# Patient Record
Sex: MALE | Race: BLACK OR AFRICAN AMERICAN | NOT HISPANIC OR LATINO | Employment: FULL TIME | URBAN - METROPOLITAN AREA
[De-identification: names, ages, dates, MRNs, and addresses within clinical notes are randomized per-mention and may not be internally consistent; named-entity substitution may affect disease eponyms.]

---

## 2017-01-20 ENCOUNTER — OFFICE VISIT (OUTPATIENT)
Dept: SURGERY | Facility: CLINIC | Age: 52
End: 2017-01-20

## 2017-01-20 DIAGNOSIS — Z09 SURGICAL FOLLOW-UP CARE: Primary | ICD-10-CM

## 2017-01-20 PROCEDURE — 99024 POSTOP FOLLOW-UP VISIT: CPT | Performed by: SURGERY

## 2017-01-20 NOTE — MR AVS SNAPSHOT
Urban Molina   2017 1:30 PM   Office Visit    Dept Phone:  [phone]   Encounter #:  [id number]    Provider:  Matti Kelley MD   Department:  [facility] GENERAL SURGERY                Your Full Care Plan              Your Updated Medication List      Notice  As of 2017  1:17 PM    You have not been prescribed any medications.            You Were Diagnosed With        Codes Comments    Surgical follow-up care    -  Primary ICD-10-CM: Z09  ICD-9-CM: V67.00       Instructions     None    Patient Instructions History      Upcoming Appointments     Visit Type Date Time Department    INMATE 2017  1:30 PM [facility]      [employer] Signup     [facility] Event[employer] allows you to send messages to your doctor, view your test results, renew your prescriptions, schedule appointments, and more. To sign up, go to [facility] [employer] and click on the Sign Up Now link in the New User? box. Enter your [employer] Activation Code exactly as it appears below along with the last four digits of your Social Security Number and your Date of Birth () to complete the sign-up process. If you do not sign up before the expiration date, you must request a new code.    Sonalight Activation Code: [email or web address]  Expires: 2/3/2017  1:17 PM    If you have questions, you can email [email or web address] or call [phone] to talk to our Sonalight staff. Remember, [employer]ight is NOT to be used for urgent needs. For medical emergencies, dial 911.               Other Info from Your Visit           Your Appointments     2017  1:30 PM EST   INMATE with Matti Kelley MD   [facility] GENERAL SURGERY (--)    [address]  [place] [address] [phone]              Allergies     No Known Allergies      Reason for Visit     Post-op Follow-up           Vital Signs     Smoking Status                   Smoker, Current Status  Unknown           Problems and Diagnoses Noted     Surgical follow-up care    -  Primary

## 2017-01-20 NOTE — PROGRESS NOTES
9 wks 4 days s/p [facility]  He is status post right inguinal hernia repair.  He is without complaints.  His testicle is normal incision is well-healed.  There is no impulse.  He may resume normal activities and I will see him back when necessary.

## 2019-10-18 ENCOUNTER — APPOINTMENT (EMERGENCY)
Dept: RADIOLOGY | Facility: HOSPITAL | Age: 54
End: 2019-10-18
Payer: COMMERCIAL

## 2019-10-18 ENCOUNTER — HOSPITAL ENCOUNTER (EMERGENCY)
Facility: HOSPITAL | Age: 54
Discharge: HOME | End: 2019-10-18
Attending: EMERGENCY MEDICINE
Payer: COMMERCIAL

## 2019-10-18 ENCOUNTER — OFFICE VISIT (OUTPATIENT)
Dept: RETAIL CLINIC | Facility: CLINIC | Age: 54
End: 2019-10-18

## 2019-10-18 VITALS
OXYGEN SATURATION: 93 % | RESPIRATION RATE: 16 BRPM | HEART RATE: 100 BPM | SYSTOLIC BLOOD PRESSURE: 136 MMHG | TEMPERATURE: 99.7 F | DIASTOLIC BLOOD PRESSURE: 84 MMHG

## 2019-10-18 VITALS
RESPIRATION RATE: 16 BRPM | DIASTOLIC BLOOD PRESSURE: 77 MMHG | SYSTOLIC BLOOD PRESSURE: 127 MMHG | HEART RATE: 64 BPM | HEIGHT: 72 IN | OXYGEN SATURATION: 99 % | WEIGHT: 166.4 LBS | BODY MASS INDEX: 22.54 KG/M2

## 2019-10-18 DIAGNOSIS — R74.01 TRANSAMINITIS: ICD-10-CM

## 2019-10-18 DIAGNOSIS — B27.90 EBV INFECTION: ICD-10-CM

## 2019-10-18 DIAGNOSIS — R17 ELEVATED BILIRUBIN: ICD-10-CM

## 2019-10-18 DIAGNOSIS — Z00.00 ENCOUNTER FOR HEALTH MAINTENANCE EXAMINATION IN ADULT: ICD-10-CM

## 2019-10-18 DIAGNOSIS — I26.93 SINGLE SUBSEGMENTAL PULMONARY EMBOLISM WITHOUT ACUTE COR PULMONALE (HCC): Primary | ICD-10-CM

## 2019-10-18 DIAGNOSIS — R73.9 HYPERGLYCEMIA: Primary | ICD-10-CM

## 2019-10-18 DIAGNOSIS — R17 JAUNDICE: ICD-10-CM

## 2019-10-18 LAB
ALBUMIN SERPL BCP-MCNC: 3 G/DL (ref 3.5–5)
ALP SERPL-CCNC: 399 U/L (ref 46–116)
ALT SERPL W P-5'-P-CCNC: 469 U/L (ref 12–78)
ANION GAP SERPL CALCULATED.3IONS-SCNC: 8 MMOL/L (ref 4–13)
APTT PPP: 26 SECONDS (ref 23–37)
AST SERPL W P-5'-P-CCNC: 318 U/L (ref 5–45)
BASOPHILS # BLD MANUAL: 0 THOUSAND/UL (ref 0–0.1)
BASOPHILS NFR MAR MANUAL: 0 % (ref 0–1)
BILIRUB SERPL-MCNC: 7.4 MG/DL (ref 0.2–1)
BUN SERPL-MCNC: 6 MG/DL (ref 5–25)
CALCIUM SERPL-MCNC: 8.4 MG/DL (ref 8.3–10.1)
CHLORIDE SERPL-SCNC: 100 MMOL/L (ref 100–108)
CO2 SERPL-SCNC: 27 MMOL/L (ref 21–32)
CREAT SERPL-MCNC: 0.67 MG/DL (ref 0.6–1.3)
D DIMER PPP FEU-MCNC: 3.78 UG/ML FEU
EOSINOPHIL # BLD MANUAL: 0 THOUSAND/UL (ref 0–0.4)
EOSINOPHIL NFR BLD MANUAL: 0 % (ref 0–6)
ERYTHROCYTE [DISTWIDTH] IN BLOOD BY AUTOMATED COUNT: 14.2 % (ref 11.6–15.1)
GFR SERPL CREATININE-BSD FRML MDRD: 107 ML/MIN/1.73SQ M
GLUCOSE BLDC GLUCOMTR-MCNC: 101 MG/DL (ref 70–130)
GLUCOSE SERPL-MCNC: 104 MG/DL (ref 65–140)
HCT VFR BLD AUTO: 38.4 % (ref 36.5–49.3)
HGB BLD-MCNC: 13.2 G/DL (ref 12–17)
INR PPP: 0.98 (ref 0.84–1.19)
LIPASE SERPL-CCNC: 147 U/L (ref 73–393)
LYMPHOCYTES # BLD AUTO: 3.36 THOUSAND/UL (ref 0.6–4.47)
LYMPHOCYTES # BLD AUTO: 34 % (ref 14–44)
MCH RBC QN AUTO: 30.8 PG (ref 26.8–34.3)
MCHC RBC AUTO-ENTMCNC: 34.4 G/DL (ref 31.4–37.4)
MCV RBC AUTO: 90 FL (ref 82–98)
MONOCYTES # BLD AUTO: 0.99 THOUSAND/UL (ref 0–1.22)
MONOCYTES NFR BLD: 10 % (ref 4–12)
MYELOCYTES NFR BLD MANUAL: 2 % (ref 0–1)
NEUTROPHILS # BLD MANUAL: 3.95 THOUSAND/UL (ref 1.85–7.62)
NEUTS BAND NFR BLD MANUAL: 12 % (ref 0–8)
NEUTS SEG NFR BLD AUTO: 28 % (ref 43–75)
NRBC BLD AUTO-RTO: 0 /100 WBCS
PLATELET # BLD AUTO: 149 THOUSANDS/UL (ref 149–390)
PLATELET BLD QL SMEAR: ABNORMAL
PMV BLD AUTO: 11.6 FL (ref 8.9–12.7)
POTASSIUM SERPL-SCNC: 3.6 MMOL/L (ref 3.5–5.3)
PROMYELOCYTES NFR BLD MANUAL: 1 % (ref 0–0)
PROT SERPL-MCNC: 6.2 G/DL (ref 6.4–8.2)
PROTHROMBIN TIME: 12.6 SECONDS (ref 11.6–14.5)
RBC # BLD AUTO: 4.28 MILLION/UL (ref 3.88–5.62)
RBC MORPH BLD: NORMAL
SODIUM SERPL-SCNC: 135 MMOL/L (ref 136–145)
TOTAL CELLS COUNTED SPEC: 100
VARIANT LYMPHS # BLD AUTO: 13 %
WBC # BLD AUTO: 9.87 THOUSAND/UL (ref 4.31–10.16)

## 2019-10-18 PROCEDURE — 96361 HYDRATE IV INFUSION ADD-ON: CPT

## 2019-10-18 PROCEDURE — 80053 COMPREHEN METABOLIC PANEL: CPT | Performed by: EMERGENCY MEDICINE

## 2019-10-18 PROCEDURE — 85610 PROTHROMBIN TIME: CPT | Performed by: EMERGENCY MEDICINE

## 2019-10-18 PROCEDURE — 82962 GLUCOSE BLOOD TEST: CPT | Performed by: NURSE PRACTITIONER

## 2019-10-18 PROCEDURE — 96375 TX/PRO/DX INJ NEW DRUG ADDON: CPT

## 2019-10-18 PROCEDURE — 85027 COMPLETE CBC AUTOMATED: CPT | Performed by: EMERGENCY MEDICINE

## 2019-10-18 PROCEDURE — 99285 EMERGENCY DEPT VISIT HI MDM: CPT

## 2019-10-18 PROCEDURE — 83690 ASSAY OF LIPASE: CPT | Performed by: EMERGENCY MEDICINE

## 2019-10-18 PROCEDURE — 99213 OFFICE O/P EST LOW 20 MIN: CPT | Performed by: NURSE PRACTITIONER

## 2019-10-18 PROCEDURE — 96374 THER/PROPH/DIAG INJ IV PUSH: CPT

## 2019-10-18 PROCEDURE — 99285 EMERGENCY DEPT VISIT HI MDM: CPT | Performed by: EMERGENCY MEDICINE

## 2019-10-18 PROCEDURE — 85730 THROMBOPLASTIN TIME PARTIAL: CPT | Performed by: EMERGENCY MEDICINE

## 2019-10-18 PROCEDURE — 71275 CT ANGIOGRAPHY CHEST: CPT

## 2019-10-18 PROCEDURE — 36415 COLL VENOUS BLD VENIPUNCTURE: CPT | Performed by: EMERGENCY MEDICINE

## 2019-10-18 PROCEDURE — 85007 BL SMEAR W/DIFF WBC COUNT: CPT | Performed by: EMERGENCY MEDICINE

## 2019-10-18 PROCEDURE — 85379 FIBRIN DEGRADATION QUANT: CPT | Performed by: EMERGENCY MEDICINE

## 2019-10-18 RX ORDER — KETOROLAC TROMETHAMINE 30 MG/ML
15 INJECTION, SOLUTION INTRAMUSCULAR; INTRAVENOUS ONCE
Status: COMPLETED | OUTPATIENT
Start: 2019-10-18 | End: 2019-10-18

## 2019-10-18 RX ORDER — ONDANSETRON 2 MG/ML
4 INJECTION INTRAMUSCULAR; INTRAVENOUS ONCE
Status: COMPLETED | OUTPATIENT
Start: 2019-10-18 | End: 2019-10-18

## 2019-10-18 RX ADMIN — RIVAROXABAN 15 MG: 15 TABLET, FILM COATED ORAL at 21:59

## 2019-10-18 RX ADMIN — SODIUM CHLORIDE 1000 ML: 0.9 INJECTION, SOLUTION INTRAVENOUS at 18:36

## 2019-10-18 RX ADMIN — IOHEXOL 85 ML: 350 INJECTION, SOLUTION INTRAVENOUS at 20:12

## 2019-10-18 RX ADMIN — KETOROLAC TROMETHAMINE 15 MG: 30 INJECTION, SOLUTION INTRAMUSCULAR at 20:34

## 2019-10-18 RX ADMIN — ONDANSETRON 4 MG: 2 INJECTION INTRAMUSCULAR; INTRAVENOUS at 18:36

## 2019-10-18 NOTE — ED PROVIDER NOTES
History  Chief Complaint   Patient presents with    Weakness - Generalized     pt reports flu s/s since 10/9, did flu swab and it was negative  pt went to [place] 2 days ago and got chest xray, CT of abdomen and US of gallbladder, was diagnosed with hepatitis A (which later came back negative) and had a high billirubin  pt reports coughing up blood with clots the size of grapes and has a headache      62 YOM with history of provoked PE not currently on anticoagulation, pre-diabetes who presents with nausea, poor appetite, and fevers  Pt reports that he began feeling ill on 10/9 with fever of 103 8F, body aches, headache, nausea, and poor appetite  He thought that his symptoms were due to the flu and saw his PCP  Influenza testing was ordered and was negative  He was also found to have an elevated bilirubin and LFTs at this time and was referred to the emergency department at [facility] for further evaluation  He had a CTAP and RUQ US which were unremarkable  He was dehydrated at this time and was given IVF  He was presumed to have hepatitis and discharged  He was then notified that his hepatitis testing was negative  Over the past 2-3 days, he has now developed hemoptysis and cough  He remains with fatigue, nausea, headache, poor appetite, and low grade fevers around 99F  He also has noticed his skin and eyes looking yellow  He denies chest pain, shortness of breath, abdominal pain, vomiting, diarrhea  None       Past Medical History:   Diagnosis Date    Prediabetes        History reviewed  No pertinent surgical history  History reviewed  No pertinent family history  I have reviewed and agree with the history as documented      Social History     Tobacco Use    Smoking status: Never Smoker    Smokeless tobacco: Never Used   Substance Use Topics    Alcohol use: Yes     Frequency: Never     Comment: socially    Drug use: Never        Review of Systems   Constitutional: Positive for appetite change, fatigue and fever  Negative for unexpected weight change  HENT: Positive for congestion  Negative for rhinorrhea and sore throat  Eyes: Negative for visual disturbance  Respiratory: Positive for cough  Negative for chest tightness and shortness of breath  Cardiovascular: Negative for chest pain and leg swelling  Gastrointestinal: Positive for nausea  Negative for abdominal distention, abdominal pain, diarrhea and vomiting  Genitourinary: Negative for dysuria, flank pain, frequency, hematuria and urgency  Musculoskeletal: Negative for back pain, gait problem and joint swelling  Skin: Positive for color change  Negative for pallor and rash  Neurological: Negative for dizziness, syncope, weakness, light-headedness and headaches  All other systems reviewed and are negative  Physical Exam  ED Triage Vitals   Temperature Pulse Respirations Blood Pressure SpO2   10/18/19 1615 10/18/19 1615 10/18/19 1615 10/18/19 1615 10/18/19 1615   99 7 °F (37 6 °C) 102 18 154/97 96 %      Temp Source Heart Rate Source Patient Position - Orthostatic VS BP Location FiO2 (%)   10/18/19 1615 10/18/19 1615 10/18/19 1615 10/18/19 1615 --   Oral Monitor Sitting Right arm       Pain Score       10/18/19 1756       2             Orthostatic Vital Signs  Vitals:    10/18/19 1756 10/18/19 1801 10/18/19 1929 10/18/19 2038   BP: 143/89 152/91 149/97 136/84   Pulse: 84 89 84 100   Patient Position - Orthostatic VS: Lying  Lying        Physical Exam   Constitutional: He is oriented to person, place, and time  No distress  HENT:   Head: Normocephalic and atraumatic  Mouth/Throat: Oropharynx is clear and moist    Eyes: Scleral icterus is present  Neck: Normal range of motion  Neck supple  Cardiovascular: Normal rate and regular rhythm  Exam reveals no gallop and no friction rub  No murmur heard  Pulmonary/Chest: Effort normal and breath sounds normal  He has no wheezes  He has no rales  Abdominal: Soft   Bowel sounds are normal  He exhibits no distension  There is no tenderness  Musculoskeletal: Normal range of motion  He exhibits no edema or tenderness  Neurological: He is alert and oriented to person, place, and time  He has normal strength  No cranial nerve deficit or sensory deficit  Coordination normal    No asterixis  Skin: Skin is warm and dry  Mildly jaundiced  Psychiatric: He has a normal mood and affect  His behavior is normal    Nursing note and vitals reviewed  ED Medications  Medications   sodium chloride 0 9 % bolus 1,000 mL (0 mL Intravenous Stopped 10/18/19 2200)   ondansetron (ZOFRAN) injection 4 mg (4 mg Intravenous Given 10/18/19 1836)   ketorolac (TORADOL) injection 15 mg (15 mg Intravenous Given 10/18/19 2034)   iohexol (OMNIPAQUE) 350 MG/ML injection (MULTI-DOSE) 85 mL (85 mL Intravenous Given 10/18/19 2012)   rivaroxaban (XARELTO) tablet 15 mg (15 mg Oral Given 10/18/19 2159)       Diagnostic Studies  Results Reviewed     Procedure Component Value Units Date/Time    Protime-INR [[id number]]  (Normal) Collected:  10/18/19 1836    Lab Status:  Final result Specimen:  Blood from Arm, Left Updated:  10/18/19 2105     Protime 12 6 seconds      INR 0 98    APTT [[id number]]  (Normal) Collected:  10/18/19 1836    Lab Status:  Final result Specimen:  Blood from Arm, Left Updated:  10/18/19 2105     PTT 26 seconds     CBC and differential [[id number]] Collected:  10/18/19 1836    Lab Status:  Final result Specimen:  Blood from Arm, Left Updated:  10/18/19 1929     WBC 9 87 Thousand/uL      RBC 4 28 Million/uL      Hemoglobin 13 2 g/dL      Hematocrit 38 4 %      MCV 90 fL      MCH 30 8 pg      MCHC 34 4 g/dL      RDW 14 2 %      MPV 11 6 fL      Platelets 990 Thousands/uL      nRBC 0 /100 WBCs     Narrative: This is an appended report  These results have been appended to a previously verified report      D-Dimer [[id number]]  (Abnormal) Collected:  10/18/19 1836    Lab Status:  Final result Specimen: Blood from Arm, Left Updated:  10/18/19 1924     D-Dimer, Quant 3 78 ug/ml FEU     Comprehensive metabolic panel [[id number]]  (Abnormal) Collected:  10/18/19 1836    Lab Status:  Final result Specimen:  Blood from Arm, Left Updated:  10/18/19 1905     Sodium 135 mmol/L      Potassium 3 6 mmol/L      Chloride 100 mmol/L      CO2 27 mmol/L      ANION GAP 8 mmol/L      BUN 6 mg/dL      Creatinine 0 67 mg/dL      Glucose 104 mg/dL      Calcium 8 4 mg/dL       U/L       U/L      Alkaline Phosphatase 399 U/L      Total Protein 6 2 g/dL      Albumin 3 0 g/dL      Total Bilirubin 7 40 mg/dL      eGFR 107 ml/min/1 73sq m     Narrative:       [place] guidelines for Chronic Kidney Disease (CKD):     Stage 1 with normal or high GFR (GFR > 90 mL/min/1 73 square meters)    Stage 2 Mild CKD (GFR = 60-89 mL/min/1 73 square meters)    Stage 3A Moderate CKD (GFR = 45-59 mL/min/1 73 square meters)    Stage 3B Moderate CKD (GFR = 30-44 mL/min/1 73 square meters)    Stage 4 Severe CKD (GFR = 15-29 mL/min/1 73 square meters)    Stage 5 End Stage CKD (GFR <15 mL/min/1 73 square meters)  Note: GFR calculation is accurate only with a steady state creatinine    Lipase [[id number]]  (Normal) Collected:  10/18/19 1836    Lab Status:  Final result Specimen:  Blood from Arm, Left Updated:  10/18/19 1905     Lipase 147 u/L                  CTA ED chest PE study   Final Result by Nelda Elena MD (10/18 2047)      Suspect filling defects identified in the left lower lobe anterior segment best seen on series 2 image 116 although since no other areas of filling defects this could also be artifactual  If there is high clinical suspicion, consider VQ scan or repeat CT    PE  Suspect pericholecystic fluid, consider follow-up right upper quadrant abdominal ultrasound               I personally discussed this study with Clifford Mata on 10/18/2019 at 8:46 PM                            Workstation performed: [id number]               Procedures  Procedures        ED Course  ED Course as of Oct 18 2342   Fri Oct 18, 2019   2045 VQ scan or repeat study       2104 PE severity index = 66 points - Class II, Low Risk: 1 7-3 5% 30-day mortality in this group  Hestia negative  2118 Will do an anti-coagulant:  - Xarelto (15 mg twice daily with food for 21 days followed by 20 mg once daily with food x3 months)                               Wells' Criteria for PE      Most Recent Value   Wells' Criteria for PE   Clinical signs and symptoms of DVT  0 Filed at: 10/18/2019 1931   PE is primary diagnosis or equally likely  0 Filed at: 10/18/2019 1931   HR >100  0 Filed at: 10/18/2019 1931   Immobilization at least 3 days or Surgery in the previous 4 weeks  0 Filed at: 10/18/2019 1931   Previous, objectively diagnosed PE or DVT  1 5 Filed at: 10/18/2019 1931   Hemoptysis  1 Filed at: 10/18/2019 1931   Malignancy with treatment within 6 months or palliative  0 Filed at: 10/18/2019 1931   Wells' Criteria Total  2 5 Filed at: 10/18/2019 1931            MDM  Number of Diagnoses or Management Options  EBV infection: new and requires workup  Elevated bilirubin: new and requires workup  Jaundice: new and requires workup  Single subsegmental pulmonary embolism without acute cor pulmonale: new and requires workup  Transaminitis: new and requires workup  Diagnosis management comments: 62 YOM with multiple constitutional symptoms, jaundice  Previous EBV testing was positive for acute infection and is likely the explanation of his symptoms  Bilirubin still rising - peak usually around 2 weeks  Given IVF for rehydration  D-dimer positive  CTPE showed possible area of defect, however, difficult to distinguish this vs artifact  This was discussed in depth with the patient and his wife along with the options of starting on anticoagulation or repeating the CT tomorrow  He opted to start on xarelto    First dose given here, sent with prescription  Advised PCP follow up  Return precautions discussed  Amount and/or Complexity of Data Reviewed  Clinical lab tests: ordered and reviewed  Tests in the radiology section of CPT®: ordered and reviewed  Decide to obtain previous medical records or to obtain history from someone other than the patient: yes  Review and summarize past medical records: yes  Discuss the patient with other providers: yes    Risk of Complications, Morbidity, and/or Mortality  Presenting problems: moderate  Management options: moderate    Patient Progress  Patient progress: stable      Disposition  Final diagnoses:   Single subsegmental pulmonary embolism without acute cor pulmonale   EBV infection   Transaminitis   Elevated bilirubin   Jaundice     Time reflects when diagnosis was documented in both MDM as applicable and the Disposition within this note     Time User Action Codes Description Comment    10/18/2019  9:21 PM Emely Orf Add [I26 93] Single subsegmental pulmonary embolism without acute cor pulmonale     10/18/2019  9:21 PM Emely Orf Add [B83 86] EBV infection     10/18/2019  9:21 PM Emely Orf Add [R74 0] Transaminitis     10/18/2019  9:21 PM Emely Orf Add [R17] Elevated bilirubin     10/18/2019  9:21 PM Conner Bell [facility] Jaundice       ED Disposition     ED Disposition Condition Date/Time Comment    Discharge Stable Fri Oct 18, 2019  9:20 PM Kavon Javier discharge to home/self care  Follow-up Information     Follow up With Specialties Details Why [address] [place] [address], DO Family Medicine In 2 days  [address]  [phone]            Discharge Medication List as of 10/18/2019  9:23 PM      START taking these medications    Details   rivaroxaban (XARELTO) 15 & 20 MG starter pack Take 15 mg by mouth twice daily for 21 days, then 20 mg once daily thereafter , Print           No discharge procedures on file      ED Provider  Attending physically available and evaluated Gleda Sender  I managed the patient along with the ED Attending      Electronically Signed by         Ashli Polanco MD  10/18/19 6178

## 2019-10-18 NOTE — PROGRESS NOTES
"  Chief Complaint   Patient presents with   • Hypertension   • Hyperglycemia     Subjective   Urban Molina is a 54 y.o. male who presents to the clinic today with complaints of: needing a check up. He travels for work and reports he if  [place] and has not seen a primary care provider in \"a while.\" He would like to have his blood pressure, lungs, glucose (finger stick) checked to see if he needs any treatment. He declines primary care referral.   HPI    No current outpatient medications on file.    Allergies:  Patient has no known allergies.    Past Medical History:   Diagnosis Date   • Back pain    • Blurred vision    • Inguinal hernia, right     sched repair   • Painful urination      Past Surgical History:   Procedure Laterality Date   • HERNIA REPAIR Right 2010    inguinal   • INGUINAL HERNIA REPAIR Right 11/14/2016    Procedure: INGUINAL HERNIA REPAIR;  Surgeon: Matti Kelley MD;  Location: [facility];  Service:    • NO PAST SURGERIES       Family History   Problem Relation Age of Onset   • Cancer Mother    • Stroke Paternal Grandfather      Social History     Tobacco Use   • Smoking status: Smoker, Current Status Unknown     Packs/day: 0.25     Years: 40.00     Pack years: 10.00     Types: Cigarettes   • Smokeless tobacco: Current User   Substance Use Topics   • Alcohol use: Yes   • Drug use: Yes     Types: Amphetamines, Marijuana, Cocaine, Other       Review of Systems  Review of Systems   Constitutional: Negative for activity change, appetite change, fatigue and unexpected weight change.   HENT: Positive for dental problem. Negative for congestion, drooling, ear discharge, ear pain, hearing loss, mouth sores, nosebleeds, postnasal drip, sinus pain, sneezing, sore throat, tinnitus, trouble swallowing and voice change.    Eyes: Negative.    Respiratory: Negative for apnea, cough, choking, chest tightness, shortness of breath, wheezing and stridor.    Cardiovascular: Negative for chest pain, " "palpitations and leg swelling.   Gastrointestinal: Negative for abdominal distention, abdominal pain, anal bleeding, blood in stool, constipation, diarrhea, nausea, rectal pain and vomiting.   Endocrine: Negative for cold intolerance, heat intolerance, polydipsia, polyphagia and polyuria.   Musculoskeletal: Negative for arthralgias, back pain, gait problem, joint swelling, myalgias, neck pain and neck stiffness.   Skin: Negative.    Neurological: Negative.    Hematological: Negative for adenopathy. Does not bruise/bleed easily.   Psychiatric/Behavioral: Negative.        Objective   /77 (BP Location: Left arm, Patient Position: Sitting, Cuff Size: Adult)   Pulse 64   Resp 16   Ht 182.9 cm (72\")   Wt 75.5 kg (166 lb 6.4 oz)   SpO2 99%   BMI 22.57 kg/m²       Physical Exam   Constitutional: He is oriented to person, place, and time. He appears well-developed and well-nourished.   HENT:   Head: Normocephalic and atraumatic.   Right Ear: Hearing, tympanic membrane, external ear and ear canal normal.   Left Ear: Hearing, tympanic membrane, external ear and ear canal normal.   Nose: Nose normal.   Mouth/Throat: Uvula is midline, oropharynx is clear and moist and mucous membranes are normal. Abnormal dentition (missing teeth ). No oropharyngeal exudate, posterior oropharyngeal edema, posterior oropharyngeal erythema or tonsillar abscesses.   Eyes: Conjunctivae and EOM are normal. Pupils are equal, round, and reactive to light.   Neck: Normal range of motion. Neck supple.   Cardiovascular: Normal rate, regular rhythm, normal heart sounds and intact distal pulses. Exam reveals no gallop and no friction rub.   No murmur heard.  Pulmonary/Chest: Effort normal and breath sounds normal. No stridor. No respiratory distress. He has no wheezes. He has no rales. He exhibits no tenderness.   Abdominal: Soft. Normal appearance and bowel sounds are normal. He exhibits no distension and no mass. There is no " hepatosplenomegaly. There is no tenderness. There is no rebound and no guarding. A hernia is present. Hernia confirmed positive in the right inguinal area.   Musculoskeletal: Normal range of motion. He exhibits no edema, tenderness or deformity.   Lymphadenopathy:     He has no cervical adenopathy.   Neurological: He is alert and oriented to person, place, and time. He displays normal reflexes. No cranial nerve deficit. Coordination normal.   Skin: Skin is warm.   Psychiatric: He has a normal mood and affect.   Vitals reviewed.      Assessment/Plan     Urban was seen today for hypertension and hyperglycemia.    Diagnoses and all orders for this visit:    Hyperglycemia  -     POCT Glucose  -     Hemoglobin A1c; Future    Encounter for health maintenance examination in adult  -     CBC No Differential; Future  -     BUN; Future  -     Lipid Panel w/ Chol/HDL Ratio; Future  -     Hemoglobin A1c; Future    Discussed his exam is normal except for some dental issues and tobacco use.   Recommend he get a primary care provider  Will get annual labs and if any abnormalities will refer for primary care follow up.   Need to fast for 8 hours before labs. He will get them done tomorrow morning.

## 2019-10-18 NOTE — PATIENT INSTRUCTIONS
Recommend he get a primary care provider  Will get annual labs and if any abnormalities will refer   Need to fast for 8 hours before labs         Health Maintenance, Male  A healthy lifestyle and preventive care is important for your health and wellness. Ask your health care provider about what schedule of regular examinations is right for you.  What should I know about weight and diet?  Eat a Healthy Diet  · Eat plenty of vegetables, fruits, whole grains, low-fat dairy products, and lean protein.  · Do not eat a lot of foods high in solid fats, added sugars, or salt.    Maintain a Healthy Weight  Regular exercise can help you achieve or maintain a healthy weight. You should:  · Do at least 150 minutes of exercise each week. The exercise should increase your heart rate and make you sweat (moderate-intensity exercise).  · Do strength-training exercises at least twice a week.  Watch Your Levels of Cholesterol and Blood Lipids  · Have your blood tested for lipids and cholesterol every 5 years starting at 35 years of age. If you are at high risk for heart disease, you should start having your blood tested when you are 20 years old. You may need to have your cholesterol levels checked more often if:  ? Your lipid or cholesterol levels are high.  ? You are older than 50 years of age.  ? You are at high risk for heart disease.  What should I know about cancer screening?  Many types of cancers can be detected early and may often be prevented.  Lung Cancer  · You should be screened every year for lung cancer if:  ? You are a current smoker who has smoked for at least 30 years.  ? You are a former smoker who has quit within the past 15 years.  · Talk to your health care provider about your screening options, when you should start screening, and how often you should be screened.  Colorectal Cancer  · Routine colorectal cancer screening usually begins at 50 years of age and should be repeated every 5-10 years until you are 75  years old. You may need to be screened more often if early forms of precancerous polyps or small growths are found. Your health care provider may recommend screening at an earlier age if you have risk factors for colon cancer.  · Your health care provider may recommend using home test kits to check for hidden blood in the stool.  · A small camera at the end of a tube can be used to examine your colon (sigmoidoscopy or colonoscopy). This checks for the earliest forms of colorectal cancer.  Prostate and Testicular Cancer  · Depending on your age and overall health, your health care provider may do certain tests to screen for prostate and testicular cancer.  · Talk to your health care provider about any symptoms or concerns you have about testicular or prostate cancer.  Skin Cancer  · Check your skin from head to toe regularly.  · Tell your health care provider about any new moles or changes in moles, especially if:  ? There is a change in a mole’s size, shape, or color.  ? You have a mole that is larger than a pencil eraser.  · Always use sunscreen. Apply sunscreen liberally and repeat throughout the day.  · Protect yourself by wearing long sleeves, pants, a wide-brimmed hat, and sunglasses when outside.  What should I know about heart disease, diabetes, and high blood pressure?  · If you are 18-39 years of age, have your blood pressure checked every 3-5 years. If you are 40 years of age or older, have your blood pressure checked every year. You should have your blood pressure measured twice--once when you are at a hospital or clinic, and once when you are not at a hospital or clinic. Record the average of the two measurements. To check your blood pressure when you are not at a hospital or clinic, you can use:  ? An automated blood pressure machine at a pharmacy.  ? A home blood pressure monitor.  · Talk to your health care provider about your target blood pressure.  · If you are between 45-79 years old, ask your  health care provider if you should take aspirin to prevent heart disease.  · Have regular diabetes screenings by checking your fasting blood sugar level.  ? If you are at a normal weight and have a low risk for diabetes, have this test once every three years after the age of 45.  ? If you are overweight and have a high risk for diabetes, consider being tested at a younger age or more often.  · A one-time screening for abdominal aortic aneurysm (AAA) by ultrasound is recommended for men aged 65-75 years who are current or former smokers.  What should I know about preventing infection?  Hepatitis B  If you have a higher risk for hepatitis B, you should be screened for this virus. Talk with your health care provider to find out if you are at risk for hepatitis B infection.  Hepatitis C  Blood testing is recommended for:  · Everyone born from 1945 through 1965.  · Anyone with known risk factors for hepatitis C.  Sexually Transmitted Diseases (STDs)  · You should be screened each year for STDs including gonorrhea and chlamydia if:  ? You are sexually active and are younger than 24 years of age.  ? You are older than 24 years of age and your health care provider tells you that you are at risk for this type of infection.  ? Your sexual activity has changed since you were last screened and you are at an increased risk for chlamydia or gonorrhea. Ask your health care provider if you are at risk.  · Talk with your health care provider about whether you are at high risk of being infected with HIV. Your health care provider may recommend a prescription medicine to help prevent HIV infection.  What else can I do?  · Schedule regular health, dental, and eye exams.  · Stay current with your vaccines (immunizations).  · Do not use any tobacco products, such as cigarettes, chewing tobacco, and e-cigarettes. If you need help quitting, ask your health care provider.  · Limit alcohol intake to no more than 2 drinks per day. One drink  equals 12 ounces of beer, 5 ounces of wine, or 1½ ounces of hard liquor.  · Do not use street drugs.  · Do not share needles.  · Ask your health care provider for help if you need support or information about quitting drugs.  · Tell your health care provider if you often feel depressed.  · Tell your health care provider if you have ever been abused or do not feel safe at home.  This information is not intended to replace advice given to you by your health care provider. Make sure you discuss any questions you have with your health care provider.  Document Released: 06/15/2009 Document Revised: 08/16/2017 Document Reviewed: 09/20/2016  [employer] Interactive Patient Education © 2019 [employer]

## 2019-10-18 NOTE — ED ATTENDING ATTESTATION
Trey Mayorga MD, saw and evaluated the patient  All available labs and X-rays were ordered by me or the resident and have been reviewed by myself  I discussed the patient with the resident / non-physician and agree with the resident's / non-physician practitioner's findings and plan as documented in the resident's / non-physician practicitioner's note, except where noted  At this point, I agree with the current assessment done in the ED  I was present during key portions of all procedures performed unless otherwise stated  Chief Complaint   Patient presents with    Weakness - Generalized     pt reports flu s/s since 10/9, did flu swab and it was negative  pt went to  2 days ago and got chest xray, CT of abdomen and US of gallbladder, was diagnosed with hepatitis A (which later came back negative) and had a high billirubin  pt reports coughing up blood with clots the size of grapes and has a headache        This is a 61-year-old male presents for evaluation of likely viral syndrome  Patient states that beginning about October night he started to have a little bit of coughing, body aches  He felt very warm with temperature of 104° F at home  He went to see his primary care doctor who did flu testing  It was negative  Labs were done which demonstrated elevated bilirubin  The patient follow up [facility] for evaluation 2 days ago  At that time he had a right upper quadrant ultrasound done as well as CT abdomen pelvis was negative for any acute pathology  Fluid patient was given fluids  He also had outpatient labs done for Amparo-Barr virus  He was told he likely has hepatitis  His hepatitis a testing came back negative  He was never told about the results of his Amparo-Veras IgM antibody testing  Because of continued nausea, poor oral intake, feelings of malaise, low-grade temperatures no longer in the 104 F range he came in for evaluation    He also states he has been coughing up blood for about 2 or 3 days  He is uncertain if it is just cough from his lungs or if it is just mucus dripping down vessel bit bloody  He does state he has had a history of a DVT years ago from a plane ride to [place]  He did recently go to [place] about 1 month ago  No chest pain  No arm pain jaw pain back pain specifically  No urinary symptoms of burning itching pain blood frequency  No one is ill around him  No new medications  Body aches are getting worse  Impression:  No acute abnormality identified in the abdomen or pelvis  Colonic diverticulosis  without evidence of diverticulitis  Impression:  1  No acute right upper quadrant pathology  No evidence of cholelithiasis or  findings to suggest cholecystitis  No biliary duct dilation  EBV VCA IgM Ab 108 H  PE:  Vitals:    10/18/19 1756 10/18/19 1801 10/18/19 1929 10/18/19 2038   BP: 143/89 152/91 149/97 136/84   BP Location: Right arm  Right arm    Pulse: 84 89 84 100   Resp: 16 16 16 16   Temp:       TempSrc:       SpO2: 96% 95% 96% 93%   General: VSS, NAD, awake, alert  Well-nourished, well-developed  Appears stated age  Speaking normally in full sentences  Head: Normocephalic, atraumatic, nontender  Eyes: PERRL, EOM-I  No diplopia  +scleral icterus  No hyphema  No subconjunctival hemorrhages  Symmetrical lids  ENT: Atraumatic external nose and ears  MMM  No malocclusion  No stridor  Normal phonation  No drooling  Normal swallowing  Neck: Symmetric, trachea midline  No JVD  CV: RRR  +S1/S2  No murmurs or gallops  Peripheral pulses +2 throughout  No chest wall tenderness  Lungs:   Unlabored No retractions  CTAB, lungs sounds equal bilateral    No tachypnea  Abd: +BS, soft, NT/ND  No appreciable splenomegaly  No CVA tenderness  MSK:   FROM   No calf tenderness  No LE edema  Back:   No rashes  Skin: Dry, intact  +jaundice  Neuro: AAOx3, GCS 15, CN II-XII grossly intact     Motor grossly intact  Psychiatric/Behavioral: Appropriate mood and affect   Exam: deferred  A:  -   Viral syndrome  P:  -  Discussed with the patient I think he likely has a seen bar virus  Discussed return to ER precautions  Will give IV fluids, make him feel better  The hemoptysis verses blood related to bronchitis though does not fit with a restless syndrome  Discussed will do a D-dimer given his history, likely discharge home regardless  Follow up primary care of all contact sports avoid immunosuppressed individuals  All of this was specifically discussed with the patient wife  - 13 point ROS was performed and all are normal unless stated in the history above  - Nursing note reviewed  Vitals reviewed  - Orders placed by myself and/or advanced practitioner / resident     - Previous chart was reviewed  - No language barrier    - History obtained from patient  - There are no limitations to the history obtained  - Critical care time: Not applicable for this patient  PE risk, Wells score = [2 5]   (0) clinically suspected DVT - 3 points   (0) alternative diagnosis is less likely than PE - 3 0 points   (0) tachycardia - 1 5 points   (0) immobilization/surgery in previous four weeks - 1 5 points   (1 5) history of DVT or PE - 1 5 points   (1) hemoptysis - 1 0 points   (0) malignancy (treatment for within 6 months, palliative) - 1 0 points   Interpretation Chales Perdomo et al  2007 Radiology 242:15-21):   Score 2 0 to 6 0 - Moderate (probability 29% based on pooled data)     Code Status: No Order  Advance Directive and Living Will:      Power of :    POLST:      Final Diagnosis:  1  Single subsegmental pulmonary embolism without acute cor pulmonale    2  EBV infection    3  Transaminitis    4  Elevated bilirubin    5   Jaundice        ED Course as of Oct 18 2254   Fri Oct 18, 2019   1937 Expected with EBV    Bands Relative(!): 12   1935 AST(!): 318   1935 ALT(!): 469   1935 Alkaline Phosphatase(!): 399   1935 Total Protein(!): 6 2   1935 Platelet Count: 198   1935 3,780  Well outside age-adjusted  Will do CTA  Add on INR/PT/PTT  D-Dimer, Quant(!): 3 78     Medications   sodium chloride 0 9 % bolus 1,000 mL (0 mL Intravenous Stopped 10/18/19 2200)   ondansetron (ZOFRAN) injection 4 mg (4 mg Intravenous Given 10/18/19 1836)   ketorolac (TORADOL) injection 15 mg (15 mg Intravenous Given 10/18/19 2034)   iohexol (OMNIPAQUE) 350 MG/ML injection (MULTI-DOSE) 85 mL (85 mL Intravenous Given 10/18/19 2012)   rivaroxaban (XARELTO) tablet 15 mg (15 mg Oral Given 10/18/19 2159)     CTA ED chest PE study   Final Result      Suspect filling defects identified in the left lower lobe anterior segment best seen on series 2 image 116 although since no other areas of filling defects this could also be artifactual  If there is high clinical suspicion, consider VQ scan or repeat CT    PE  Suspect pericholecystic fluid, consider follow-up right upper quadrant abdominal ultrasound  I personally discussed this study with Amarilis Screen on 10/18/2019 at 8:46 PM                            Workstation performed: [id number]           Orders Placed This Encounter   Procedures    CTA ED chest PE study    CBC and differential    Comprehensive metabolic panel    Lipase    D-Dimer    Protime-INR    APTT     Labs Reviewed   COMPREHENSIVE METABOLIC PANEL - Abnormal       Result Value Ref Range Status    Sodium 135 (*) 136 - 145 mmol/L Final    Potassium 3 6  3 5 - 5 3 mmol/L Final    Comment: Slightly Hemolyzed; Results May be Affected&XA&Slightly Hemolyzed;  Results May be Affected    Chloride 100  100 - 108 mmol/L Final    CO2 27  21 - 32 mmol/L Final    ANION GAP 8  4 - 13 mmol/L Final    BUN 6  5 - 25 mg/dL Final    Creatinine 0 67  0 60 - 1 30 mg/dL Final    Comment: Standardized to IDMS reference method    Glucose 104  65 - 140 mg/dL Final    Comment:   If the patient is fasting, the ADA then defines impaired fasting glucose as > 100 mg/dL and diabetes as > or equal to 123 mg/dL  Specimen collection should occur prior to Sulfasalazine administration due to the potential for falsely depressed results  Specimen collection should occur prior to Sulfapyridine administration due to the potential for falsely elevated results  Calcium 8 4  8 3 - 10 1 mg/dL Final     (*) 5 - 45 U/L Final    Comment: Slightly Hemolyzed; Results May be Affected&XA&Slightly Hemolyzed; Results May be Affected  Specimen collection should occur prior to Sulfasalazine administration due to the potential for falsely depressed results   (*) 12 - 78 U/L Final    Comment:   Specimen collection should occur prior to Sulfasalazine and/or Sulfapyridine administration due to the potential for falsely depressed results  Alkaline Phosphatase 399 (*) 46 - 116 U/L Final    Total Protein 6 2 (*) 6 4 - 8 2 g/dL Final    Albumin 3 0 (*) 3 5 - 5 0 g/dL Final    Total Bilirubin 7 40 (*) 0 20 - 1 00 mg/dL Final    eGFR 107  ml/min/1 73sq m Final    Narrative:     [place] guidelines for Chronic Kidney Disease (CKD):     Stage 1 with normal or high GFR (GFR > 90 mL/min/1 73 square meters)    Stage 2 Mild CKD (GFR = 60-89 mL/min/1 73 square meters)    Stage 3A Moderate CKD (GFR = 45-59 mL/min/1 73 square meters)    Stage 3B Moderate CKD (GFR = 30-44 mL/min/1 73 square meters)    Stage 4 Severe CKD (GFR = 15-29 mL/min/1 73 square meters)    Stage 5 End Stage CKD (GFR <15 mL/min/1 73 square meters)  Note: GFR calculation is accurate only with a steady state creatinine   D-DIMER, QUANTITATIVE - Abnormal    D-Dimer, Quant 3 78 (*) <0 50 ug/ml FEU Final    Comment:   Reference and upper limits to exclude DVT and PE are the same  Do not use to exclude if clinical symptoms are present     MANUAL DIFFERENTIAL(PHLEBS DO NOT ORDER) - Abnormal    Segmented % 28 (*) 43 - 75 % Final    Bands % 12 (*) 0 - 8 % Final    Lymphocytes % 34 14 - 44 % Final    Monocytes % 10  4 - 12 % Final    Eosinophils, % 0  0 - 6 % Final    Basophils % 0  0 - 1 % Final    Myelocytes % 2 (*) 0 - 1 % Final    Promyelocytes % 1 (*) 0 - 0 % Final    Atypical Lymphocytes % 13 (*) <=0 % Final    Absolute Neutrophils 3 95  1 85 - 7 62 Thousand/uL Final    Lymphocytes Absolute 3 36  0 60 - 4 47 Thousand/uL Final    Monocytes Absolute 0 99  0 00 - 1 22 Thousand/uL Final    Eosinophils Absolute 0 00  0 00 - 0 40 Thousand/uL Final    Basophils Absolute 0 00  0 00 - 0 10 Thousand/uL Final    Total Counted 100   Final    RBC Morphology Normal   Final    Platelet Estimate Borderline (*) Adequate Final   LIPASE - Normal    Lipase 147  73 - 393 u/L Final   PROTIME-INR - Normal    Protime 12 6  11 6 - 14 5 seconds Final    INR 0 98  0 84 - 1 19 Final   APTT - Normal    PTT 26  23 - 37 seconds Final    Comment: Therapeutic Heparin Range =  60-90 seconds   CBC AND DIFFERENTIAL    WBC 9 87  4 31 - 10 16 Thousand/uL Final    RBC 4 28  3 88 - 5 62 Million/uL Final    Hemoglobin 13 2  12 0 - 17 0 g/dL Final    Hematocrit 38 4  36 5 - 49 3 % Final    MCV 90  82 - 98 fL Final    MCH 30 8  26 8 - 34 3 pg Final    MCHC 34 4  31 4 - 37 4 g/dL Final    RDW 14 2  11 6 - 15 1 % Final    MPV 11 6  8 9 - 12 7 fL Final    Platelets 491  173 - 390 Thousands/uL Final    nRBC 0  /100 WBCs Final    Narrative: This is an appended report  These results have been appended to a previously verified report       Time reflects when diagnosis was documented in both MDM as applicable and the Disposition within this note     Time User Action Codes Description Comment    10/18/2019  9:21 PM Rosemarie Patter Add [I26 93] Single subsegmental pulmonary embolism without acute cor pulmonale     10/18/2019  9:21 PM Rosemarie Patter Add [A92 62] EBV infection     10/18/2019  9:21 PM Rosemarie Patter Add [R74 0] Transaminitis     10/18/2019  9:21 PM Rosemarie Patter Add [R17] Elevated bilirubin     10/18/2019  9:21 PM Yonas Kinsey, [address] [facility] Jaundice       ED Disposition     ED Disposition Condition Date/Time Comment    Discharge Stable Fri Oct 18, 2019  9:20 PM Alexander Norman discharge to home/self care  Follow-up Information     Follow up With Specialties Details Why [address] Max Rivas, DO Family Medicine In 2 days  [address]  [phone]          Discharge Medication List as of 10/18/2019  9:23 PM      START taking these medications    Details   rivaroxaban (XARELTO) 15 & 20 MG starter pack Take 15 mg by mouth twice daily for 21 days, then 20 mg once daily thereafter , Print           No discharge procedures on file  None       Portions of the record may have been created with voice recognition software  Occasional wrong word or "sound a like" substitutions may have occurred due to the inherent limitations of voice recognition software  Read the chart carefully and recognize, using context, where substitutions have occurred      Electronically signed by:  Aden Armenta

## 2019-10-19 ENCOUNTER — TELEPHONE (OUTPATIENT)
Dept: RETAIL CLINIC | Facility: CLINIC | Age: 54
End: 2019-10-19

## 2019-10-19 ENCOUNTER — LAB (OUTPATIENT)
Dept: LAB | Facility: HOSPITAL | Age: 54
End: 2019-10-19

## 2019-10-19 ENCOUNTER — TRANSCRIBE ORDERS (OUTPATIENT)
Dept: ADMINISTRATIVE | Facility: HOSPITAL | Age: 54
End: 2019-10-19

## 2019-10-19 DIAGNOSIS — Z00.00 ENCOUNTER FOR HEALTH MAINTENANCE EXAMINATION IN ADULT: ICD-10-CM

## 2019-10-19 DIAGNOSIS — Z00.00 ROUTINE GENERAL MEDICAL EXAMINATION AT A HEALTH CARE FACILITY: Primary | ICD-10-CM

## 2019-10-19 DIAGNOSIS — Z00.00 HEALTHCARE MAINTENANCE: Primary | ICD-10-CM

## 2019-10-19 DIAGNOSIS — Z00.00 HEALTHCARE MAINTENANCE: ICD-10-CM

## 2019-10-19 DIAGNOSIS — Z00.00 ROUTINE GENERAL MEDICAL EXAMINATION AT A HEALTH CARE FACILITY: ICD-10-CM

## 2019-10-19 DIAGNOSIS — R73.9 HYPERGLYCEMIA: ICD-10-CM

## 2019-10-19 LAB
ANION GAP SERPL CALCULATED.3IONS-SCNC: 4 MMOL/L (ref 4–13)
BUN BLD-MCNC: 11 MG/DL (ref 5–21)
BUN BLD-MCNC: 12 MG/DL (ref 5–21)
BUN/CREAT SERPL: 12.8
CALCIUM SPEC-SCNC: 9.5 MG/DL (ref 8.4–10.4)
CHLORIDE SERPL-SCNC: 106 MMOL/L (ref 98–110)
CHOLEST SERPL-MCNC: 152 MG/DL (ref 130–200)
CO2 SERPL-SCNC: 31 MMOL/L (ref 24–31)
CREAT BLD-MCNC: 0.94 MG/DL (ref 0.5–1.4)
ERYTHROCYTE [DISTWIDTH] IN BLOOD BY AUTOMATED COUNT: 12.8 % (ref 12.3–15.4)
GFR SERPL CREATININE-BSD FRML MDRD: 101 ML/MIN/1.73
GLUCOSE BLD-MCNC: 93 MG/DL (ref 70–100)
HBA1C MFR BLD: 5.3 % (ref 4.8–5.9)
HCT VFR BLD AUTO: 42.2 % (ref 37.5–51)
HDLC SERPL-MCNC: 62 MG/DL
HGB BLD-MCNC: 14.4 G/DL (ref 13–17.7)
LDLC SERPL CALC-MCNC: 82 MG/DL (ref 0–99)
LDLC/HDLC SERPL: 1.32 {RATIO}
MCH RBC QN AUTO: 32.4 PG (ref 26.6–33)
MCHC RBC AUTO-ENTMCNC: 34.1 G/DL (ref 31.5–35.7)
MCV RBC AUTO: 95 FL (ref 79–97)
PLATELET # BLD AUTO: 224 10*3/MM3 (ref 140–450)
PMV BLD AUTO: 9.7 FL (ref 6–12)
POTASSIUM BLD-SCNC: 4.9 MMOL/L (ref 3.5–5.3)
RBC # BLD AUTO: 4.44 10*6/MM3 (ref 4.14–5.8)
SODIUM BLD-SCNC: 141 MMOL/L (ref 135–145)
TRIGL SERPL-MCNC: 42 MG/DL (ref 0–149)
VLDLC SERPL-MCNC: 8.4 MG/DL
WBC NRBC COR # BLD: 7.4 10*3/MM3 (ref 3.4–10.8)

## 2019-10-19 PROCEDURE — 85027 COMPLETE CBC AUTOMATED: CPT

## 2019-10-19 PROCEDURE — 84520 ASSAY OF UREA NITROGEN: CPT

## 2019-10-19 PROCEDURE — 83036 HEMOGLOBIN GLYCOSYLATED A1C: CPT

## 2019-10-19 PROCEDURE — 80061 LIPID PANEL: CPT

## 2019-10-19 PROCEDURE — 36415 COLL VENOUS BLD VENIPUNCTURE: CPT

## 2019-10-19 PROCEDURE — 80048 BASIC METABOLIC PNL TOTAL CA: CPT

## 2019-10-19 NOTE — DISCHARGE INSTRUCTIONS
Please return to the emergency department if you cough up large amounts of blood, have worsening symptoms, experience black or bloody stools, or anything else concerning to you

## 2019-10-20 ENCOUNTER — TELEPHONE (OUTPATIENT)
Dept: RETAIL CLINIC | Facility: CLINIC | Age: 54
End: 2019-10-20

## 2020-01-08 ENCOUNTER — APPOINTMENT (OUTPATIENT)
Dept: GENERAL RADIOLOGY | Facility: HOSPITAL | Age: 55
End: 2020-01-08

## 2020-01-08 ENCOUNTER — HOSPITAL ENCOUNTER (EMERGENCY)
Facility: HOSPITAL | Age: 55
Discharge: HOME | End: 2020-01-08
Admitting: EMERGENCY MEDICINE

## 2020-01-08 VITALS
HEIGHT: 74 IN | HEART RATE: 50 BPM | OXYGEN SATURATION: 99 % | SYSTOLIC BLOOD PRESSURE: 131 MMHG | DIASTOLIC BLOOD PRESSURE: 76 MMHG | WEIGHT: 175 LBS | BODY MASS INDEX: 22.46 KG/M2 | TEMPERATURE: 97.9 F | RESPIRATION RATE: 16 BRPM

## 2020-01-08 DIAGNOSIS — S46.811A STRAIN OF RIGHT TRAPEZIUS MUSCLE, INITIAL ENCOUNTER: Primary | ICD-10-CM

## 2020-01-08 PROCEDURE — 99283 EMERGENCY DEPT VISIT LOW MDM: CPT

## 2020-01-08 PROCEDURE — 25010000002 ORPHENADRINE CITRATE PER 60 MG: Performed by: PHYSICIAN ASSISTANT

## 2020-01-08 PROCEDURE — 25010000002 KETOROLAC TROMETHAMINE PER 15 MG: Performed by: PHYSICIAN ASSISTANT

## 2020-01-08 PROCEDURE — 96372 THER/PROPH/DIAG INJ SC/IM: CPT

## 2020-01-08 PROCEDURE — 72050 X-RAY EXAM NECK SPINE 4/5VWS: CPT

## 2020-01-08 RX ORDER — ORPHENADRINE CITRATE 30 MG/ML
60 INJECTION INTRAMUSCULAR; INTRAVENOUS ONCE
Status: COMPLETED | OUTPATIENT
Start: 2020-01-08 | End: 2020-01-08

## 2020-01-08 RX ORDER — BACLOFEN 10 MG/1
10 TABLET ORAL 3 TIMES DAILY
Qty: 15 TABLET | Refills: 0 | OUTPATIENT
Start: 2020-01-08 | End: 2020-01-18

## 2020-01-08 RX ORDER — ETODOLAC 200 MG/1
200 CAPSULE ORAL EVERY 8 HOURS
Qty: 15 CAPSULE | Refills: 0 | OUTPATIENT
Start: 2020-01-08 | End: 2020-01-18

## 2020-01-08 RX ORDER — KETOROLAC TROMETHAMINE 15 MG/ML
15 INJECTION, SOLUTION INTRAMUSCULAR; INTRAVENOUS ONCE
Status: COMPLETED | OUTPATIENT
Start: 2020-01-08 | End: 2020-01-08

## 2020-01-08 RX ADMIN — KETOROLAC TROMETHAMINE 15 MG: 15 INJECTION, SOLUTION INTRAMUSCULAR; INTRAVENOUS at 22:20

## 2020-01-08 RX ADMIN — ORPHENADRINE CITRATE 60 MG: 30 INJECTION INTRAMUSCULAR; INTRAVENOUS at 22:20

## 2020-01-09 NOTE — ED PROVIDER NOTES
Subjective   54-year-old male presents with a chief complaint of right-sided neck pain.  The patient reports at work today he was wearing his hard hat when a hammer had slipped out of a fellow coworker's hand and fell 9 feet and hit his hard hat and he had fell forward he did not hit his head on the ground is head was protected from the hard hat but he has had progressive right-sided neck pain.  He reports a tightness.          Review of Systems   All other systems reviewed and are negative.      Past Medical History:   Diagnosis Date   • Back pain    • Blurred vision    • Inguinal hernia, right     sched repair   • Painful urination        No Known Allergies    Past Surgical History:   Procedure Laterality Date   • COLONOSCOPY     • HERNIA REPAIR Right 2010    inguinal   • INGUINAL HERNIA REPAIR Right 11/14/2016    Procedure: INGUINAL HERNIA REPAIR;  Surgeon: Matti Kelley MD;  Location: [facility];  Service:    • NO PAST SURGERIES         Family History   Problem Relation Age of Onset   • Cancer Mother    • Stroke Paternal Grandfather        Social History     Socioeconomic History   • Marital status:      Spouse name: Not on file   • Number of children: Not on file   • Years of education: Not on file   • Highest education level: Not on file   Tobacco Use   • Smoking status: Smoker, Current Status Unknown     Packs/day: 0.25     Years: 40.00     Pack years: 10.00     Types: Cigarettes   • Smokeless tobacco: Current User   Substance and Sexual Activity   • Alcohol use: Yes   • Drug use: Yes     Types: Amphetamines, Marijuana, Cocaine, Other   • Sexual activity: Defer           Objective   Physical Exam   Constitutional: He is oriented to person, place, and time. He appears well-developed and well-nourished.   HENT:   Head: Normocephalic and atraumatic.   Eyes: Pupils are equal, round, and reactive to light. EOM are normal.   Neck: Normal range of motion. Neck supple.   Cardiovascular: Normal rate and  regular rhythm.   Pulmonary/Chest: Effort normal and breath sounds normal.   Abdominal: Soft. Bowel sounds are normal.   Musculoskeletal: Normal range of motion.   Right sided neck tenderness, trapezius   Neurological: He is alert and oriented to person, place, and time.   Skin: Skin is warm. Capillary refill takes less than 2 seconds.   Psychiatric: He has a normal mood and affect. His behavior is normal.   Nursing note and vitals reviewed.      Procedures           ED Course          Labs Reviewed - No data to display  XR Spine Cervical Complete 4 or 5 View    (Results Pending)                  [place] Coma Scale Score: 15                          MDM  Number of Diagnoses or Management Options  Diagnosis management comments: Trapezius strain, dc in stable condition        Amount and/or Complexity of Data Reviewed  Tests in the radiology section of CPT®: ordered and reviewed    Risk of Complications, Morbidity, and/or Mortality  Presenting problems: moderate  Diagnostic procedures: moderate  Management options: moderate    Patient Progress  Patient progress: stable      Final diagnoses:   Strain of right trapezius muscle, initial encounter            Chano Almaguer PA-C  01/08/20 [id number]

## 2020-01-18 ENCOUNTER — HOSPITAL ENCOUNTER (EMERGENCY)
Facility: HOSPITAL | Age: 55
Discharge: HOME | End: 2020-01-18
Admitting: EMERGENCY MEDICINE

## 2020-01-18 ENCOUNTER — APPOINTMENT (OUTPATIENT)
Dept: CT IMAGING | Facility: HOSPITAL | Age: 55
End: 2020-01-18

## 2020-01-18 VITALS
DIASTOLIC BLOOD PRESSURE: 77 MMHG | OXYGEN SATURATION: 100 % | RESPIRATION RATE: 16 BRPM | HEART RATE: 55 BPM | TEMPERATURE: 97.9 F | WEIGHT: 170 LBS | BODY MASS INDEX: 23.03 KG/M2 | HEIGHT: 72 IN | SYSTOLIC BLOOD PRESSURE: 127 MMHG

## 2020-01-18 DIAGNOSIS — S06.0X0A CONCUSSION WITHOUT LOSS OF CONSCIOUSNESS, INITIAL ENCOUNTER: Primary | ICD-10-CM

## 2020-01-18 DIAGNOSIS — S16.1XXA STRAIN OF NECK MUSCLE, INITIAL ENCOUNTER: ICD-10-CM

## 2020-01-18 PROCEDURE — 72125 CT NECK SPINE W/O DYE: CPT

## 2020-01-18 PROCEDURE — 70450 CT HEAD/BRAIN W/O DYE: CPT

## 2020-01-18 PROCEDURE — 99282 EMERGENCY DEPT VISIT SF MDM: CPT

## 2020-01-18 RX ORDER — IBUPROFEN 800 MG/1
800 TABLET ORAL EVERY 8 HOURS PRN
Qty: 9 TABLET | Refills: 0 | Status: SHIPPED | OUTPATIENT
Start: 2020-01-18 | End: 2020-01-21

## 2020-01-18 RX ORDER — CYCLOBENZAPRINE HCL 10 MG
10 TABLET ORAL 3 TIMES DAILY PRN
Qty: 9 TABLET | Refills: 0 | Status: SHIPPED | OUTPATIENT
Start: 2020-01-18 | End: 2020-01-21

## 2020-01-18 NOTE — ED PROVIDER NOTES
"Subjective   Patient is a 54-year-old male that presents to the ER today with complaint of head injury.  The patient states that on January 8, 2019 he was at work.  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  He states that there was someone approximately 8 feet above him hammering and they dropped to the hammer.  He states that it fell and hit him in the back of the head.  He states that he did not pass out but did seem kind of \"woozy \"for a few minutes.  The patient states that it also hit his lower neck area.  He reports neck pain.  The patient was seen at this facility had x-ray of the neck performed that showed no acute findings.  He was discharged home with a prescription for baclofen and Lodine.  He states that it is not helping the pain.  The patient states that now he feels like his balance is off.  He states that he is having intermittent blurry vision and continues to have a headache and neck pain.  He presents the ER today for further evaluation.      History provided by:  Patient   used: No    Head Injury   Location:  Occipital  Time since incident:  10 days  Mechanism of injury: direct blow    Pain details:     Quality:  Aching and dull    Severity:  Mild    Duration:  10 days    Timing:  Constant    Progression:  Worsening  Chronicity:  New  Relieved by:  Nothing  Worsened by:  Nothing  Ineffective treatments: lodine, baclofen.  Associated symptoms: blurred vision and neck pain    Associated symptoms: no difficulty breathing, no disorientation, no double vision, no focal weakness, no headaches, no hearing loss, no loss of consciousness, no memory loss, no nausea, no numbness, no seizures, no tinnitus and no vomiting    Risk factors: occupational exposure    Risk factors: no alcohol use, no aspirin use, not elderly and no obesity        Review of Systems   HENT: Negative for hearing loss and tinnitus.    Eyes: Positive for blurred " vision. Negative for double vision.   Gastrointestinal: Negative for nausea and vomiting.   Musculoskeletal: Positive for neck pain.   Neurological: Negative for focal weakness, seizures, loss of consciousness, numbness and headaches.   Psychiatric/Behavioral: Negative for memory loss.   All other systems reviewed and are negative.      Past Medical History:   Diagnosis Date   • Back pain    • Blurred vision    • Inguinal hernia, right     sched repair   • Painful urination        No Known Allergies    Past Surgical History:   Procedure Laterality Date   • COLONOSCOPY     • HERNIA REPAIR Right 2010    inguinal   • INGUINAL HERNIA REPAIR Right 11/14/2016    Procedure: INGUINAL HERNIA REPAIR;  Surgeon: Matti Kelley MD;  Location: [facility];  Service:    • NO PAST SURGERIES         Family History   Problem Relation Age of Onset   • Cancer Mother    • Stroke Paternal Grandfather        Social History     Socioeconomic History   • Marital status:      Spouse name: Not on file   • Number of children: Not on file   • Years of education: Not on file   • Highest education level: Not on file   Tobacco Use   • Smoking status: Smoker, Current Status Unknown     Packs/day: 0.25     Years: 40.00     Pack years: 10.00     Types: Cigarettes   • Smokeless tobacco: Current User     Types: Snuff, Chew   Substance and Sexual Activity   • Alcohol use: Yes   • Drug use: Yes     Types: Amphetamines, Marijuana, Cocaine, Other   • Sexual activity: Defer           Objective   Physical Exam   Constitutional: He is oriented to person, place, and time. He appears well-developed and well-nourished.   HENT:   Head: Normocephalic.   Right Ear: External ear normal.   Left Ear: External ear normal.   Nose: Nose normal.   Mouth/Throat: Oropharynx is clear and moist.   Eyes: Pupils are equal, round, and reactive to light. Conjunctivae and EOM are normal.   No nystagmus noted   Neck: Normal range of motion. Neck supple.   Cardiovascular:  Normal rate, regular rhythm and normal heart sounds.   Pulmonary/Chest: Effort normal and breath sounds normal.   Neurological: He is alert and oriented to person, place, and time.   Skin: Skin is warm and dry. Capillary refill takes less than 2 seconds.   Psychiatric: He has a normal mood and affect.   Nursing note and vitals reviewed.      Procedures           ED Course  ED Course as of Jan 18 1455   Sat Jan 18, 2020   1329 A CT scan head was ordered due to mechanism of injury.     [LF]   1444 The patient CT scan of the head and cervical spine showed no acute findings.  At this time the patient be discharged home in stable condition.  I given the patient instructions regarding concussion.  I advised him to follow-up with either occupational health or with his primary care provider on Monday for recheck.  He will be discharged home at this time in stable condition.  The patient may stop the baclofen and Lodine.  I am going to give him a prescription for Flexeril and IBU per pt request. The patient be discharged home at this time in stable condition advised return the ER for any new or worsening symptoms.    [LF]      ED Course User Index  [LF] Maggi Rea, APRN                                       CT Head Without Contrast   Final Result   Impression:     1. No acute intracranial process.   This report was finalized on 01/18/2020 14:21 by Dr Sim Liu, .      CT Cervical Spine Without Contrast   Final Result   1. No evidence of acute osseous injury or malalignment in the cervical   spine.           This report was finalized on 01/18/2020 14:23 by Dr Sim Liu, .        Labs Reviewed - No data to display          MDM  Number of Diagnoses or Management Options  Concussion without loss of consciousness, initial encounter: new and requires workup  Strain of neck muscle, initial encounter: established and worsening     Amount and/or Complexity of Data Reviewed  Tests in the radiology section of CPT®:  ordered and reviewed    Patient Progress  Patient progress: stable      Final diagnoses:   Concussion without loss of consciousness, initial encounter   Strain of neck muscle, initial encounter            Maggi Rea, APRN  01/18/20 [id number]

## 2020-01-29 ENCOUNTER — OFFICE VISIT (OUTPATIENT)
Dept: INTERNAL MEDICINE | Facility: CLINIC | Age: 55
End: 2020-01-29

## 2020-01-29 VITALS
DIASTOLIC BLOOD PRESSURE: 72 MMHG | BODY MASS INDEX: 22.07 KG/M2 | RESPIRATION RATE: 18 BRPM | TEMPERATURE: 98.5 F | SYSTOLIC BLOOD PRESSURE: 110 MMHG | HEART RATE: 64 BPM | OXYGEN SATURATION: 98 % | WEIGHT: 172 LBS | HEIGHT: 74 IN

## 2020-01-29 DIAGNOSIS — M99.00 SOMATIC DYSFUNCTION OF HEAD REGION: ICD-10-CM

## 2020-01-29 DIAGNOSIS — F07.81 POST CONCUSSION SYNDROME: Primary | ICD-10-CM

## 2020-01-29 DIAGNOSIS — M99.01 SOMATIC DYSFUNCTION OF SPINE, CERVICAL: ICD-10-CM

## 2020-01-29 PROCEDURE — 98925 OSTEOPATH MANJ 1-2 REGIONS: CPT | Performed by: FAMILY MEDICINE

## 2020-01-29 PROCEDURE — 99204 OFFICE O/P NEW MOD 45 MIN: CPT | Performed by: FAMILY MEDICINE

## 2020-01-29 NOTE — PROGRESS NOTES
CC:   Chief Complaint   Patient presents with   • Head Injury     Workman's Comp visit. Head injury 01/08/2020       History:  Urban Molina is a 55 y.o. male who presents today for evaluation of the above problems.      Here for follow-up after head injury on 1/8/2020 when someone dropped a hammer from 8 feet above him landing on the back of his head.  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  He was seen in the ED on 1/18/2020, head CT was without acute intracranial process, and CT cervical spine was found to have no evidence of acute osseous injury or malalignment, though he did have multilevel loss of intervertebral disc heights as well as multilevel uncovertebral spurring with neuroforaminal narrowing most notably at C3-C4.    Currently has dizziness on L posterior scalp following injury, some mild memory issues, and dizziness affecting balance without feeling like he will fall.    Normal cholesterol, A1c, CBC, BMP on October 2019    ROS:  Review of Systems   Musculoskeletal: Positive for neck pain.   Neurological: Positive for dizziness, numbness and headaches. Negative for tremors, syncope, facial asymmetry, speech difficulty and weakness.   All other systems reviewed and are negative.      No Known Allergies  Past Medical History:   Diagnosis Date   • Back pain    • Blurred vision    • Head injury due to trauma    • Inguinal hernia, right     sched repair   • Painful urination      Past Surgical History:   Procedure Laterality Date   • COLONOSCOPY     • HERNIA REPAIR Right 2010    inguinal   • INGUINAL HERNIA REPAIR Right 11/14/2016    Procedure: INGUINAL HERNIA REPAIR;  Surgeon: Matti Kelley MD;  Location: [facility];  Service:    • NO PAST SURGERIES       Family History   Problem Relation Age of Onset   • Cancer Mother    • Stroke Paternal Grandfather       reports that he has been smoking cigarettes. He has a 10.00 pack-year smoking  "history. His smokeless tobacco use includes snuff. He reports that he drinks alcohol. He reports that he has current or past drug history. Drugs: Amphetamines, Marijuana, Cocaine, and Other.    No current outpatient medications on file.    OBJECTIVE:  /72 (BP Location: Left arm, Patient Position: Sitting, Cuff Size: Adult)   Pulse 64   Temp 98.5 °F (36.9 °C) (Temporal)   Resp 18   Ht 188 cm (74\")   Wt 78 kg (172 lb)   SpO2 98%   BMI 22.08 kg/m²    Physical Exam   Constitutional: He is oriented to person, place, and time. No distress.   HENT:   Head: Normocephalic and atraumatic.   Nose: Nose normal.   Eyes: Conjunctivae are normal. Right eye exhibits no discharge. Left eye exhibits no discharge. No scleral icterus.   Neck: No tracheal deviation present.   Cardiovascular: Normal rate, regular rhythm and normal heart sounds. Exam reveals no gallop and no friction rub.   No murmur heard.  Pulmonary/Chest: Effort normal and breath sounds normal. No respiratory distress. He has no wheezes. He has no rales.   Neurological: He is alert and oriented to person, place, and time. He has normal strength. He displays normal reflexes. No cranial nerve deficit or sensory deficit. He exhibits normal muscle tone. He displays no Babinski's sign on the right side. He displays no Babinski's sign on the left side.   Reflex Scores:       Tricep reflexes are 2+ on the right side and 2+ on the left side.       Bicep reflexes are 2+ on the right side and 2+ on the left side.       Brachioradialis reflexes are 2+ on the right side and 2+ on the left side.       Patellar reflexes are 2+ on the right side and 2+ on the left side.       Achilles reflexes are 2+ on the right side and 2+ on the left side.  Skin: Skin is warm and dry. He is not diaphoretic. No pallor.   Psychiatric: He has a normal mood and affect. His behavior is normal. Judgment and thought content normal.   Nursing note and vitals reviewed.    Osteopathic " Structural Exam  Procedure Note for Osteopathic Manipulative Treatment    Pre-procedure diagnoses: Somatic dysfunctions as listed below.  Consent: Oral consent given for Osteopathic Treatment  Post-procedure diagnoses: same  Complications of procedure: none, patient tolerated procedure well    The evaluation including the history, physical exam and the management decisions, indicate than an appropriate intervention on this date of service is osteopathic manipulative treatment. Oral permission for the osteopathic procedure was obtained. The following treatment methods and the responses for each body region are listed below.        Region Somatic Dysfunction Severity OMT technique Response      Head R parietal internal rotation  R occipital condylar compression severe Osteopathy in the cranial field Improved      Cervical C3 FRSL Moderate Balanced ligamentous tension Improved         Assessment/Plan     Diagnosis Plan   1. Post concussion syndrome  Ambulatory Referral to Physical Therapy Evaluate and treat   2. Somatic dysfunction of head region     3. Somatic dysfunction of spine, cervical     -OMT to balance autonomic tone, improve fascial symmetry and respiratory/circulatory/lymphatic compliance  -f/u in 2 weeks, ok to return to work when dizziness improves  -PT referral for balance training    An After Visit Summary was printed and given to the patient at discharge.  Return in about 2 weeks (around 2/12/2020) for Recheck.         Urban Coello D.O.  [facility]  Osteopathic Neuromusculoskeletal Medicine  1/30/2020

## 2020-02-03 ENCOUNTER — TREATMENT (OUTPATIENT)
Dept: PHYSICAL THERAPY | Facility: CLINIC | Age: 55
End: 2020-02-03

## 2020-02-03 DIAGNOSIS — M54.2 CERVICAL PAIN: Primary | ICD-10-CM

## 2020-02-03 DIAGNOSIS — F07.81 POST CONCUSSION SYNDROME: ICD-10-CM

## 2020-02-03 PROCEDURE — 97162 PT EVAL MOD COMPLEX 30 MIN: CPT | Performed by: PHYSICAL THERAPIST

## 2020-02-03 NOTE — PROGRESS NOTES
.Outpatient Physical Therapy Neuro Initial Evaluation       Patient Name: Urban Molina  : 1965  MRN: [id number]  Today's Date: 2/3/2020      Visit Date: 2020    There is no problem list on file for this patient.       Past Medical History:   Diagnosis Date   • Back pain    • Blurred vision    • Head injury due to trauma    • Inguinal hernia, right     sched repair   • Painful urination         Past Surgical History:   Procedure Laterality Date   • COLONOSCOPY     • HERNIA REPAIR Right     inguinal   • INGUINAL HERNIA REPAIR Right 2016    Procedure: INGUINAL HERNIA REPAIR;  Surgeon: Matti Kelley MD;  Location: [facility];  Service:    • NO PAST SURGERIES           Visit Dx:     ICD-10-CM ICD-9-CM   1. Cervical pain M54.2 723.1   2. Post concussion syndrome F07.81 310.2       Patient History     Row Name 20 1100             History    Chief Complaint  Pain;Balance Problems;Headache  -HR      Type of Pain  Neck pain  -HR      Date Current Problem(s) Began  20  -HR      Brief Description of Current Complaint  He was at work when his co-worker who was hammering 8' overhead lost his  on the hammer and it came down and hit Mr. Molina on the R side of his hard hat, knocking the hat off and dazing him. He then developed nausea and other symptoms of head injury while still at work and had to leave and seek medical attention. He continues to have daily headaches, R sided neck pain and trouble with his balance.   -HR      Patient/Caregiver Goals  Relieve pain;Return to prior level of function  -HR      Patient's Rating of General Health  Very good  -HR      Hand Dominance  right-handed  -HR      Occupation/sports/leisure activities  construction  -HR      Patient seeing anyone else for problem(s)?  MD  -HR      How has patient tried to help current problem?  NSAIDs  -HR      What clinical tests have you had for this problem?  X-ray;CT scan  -HR      Results of Clinical Tests  no  evidence of fracture  -HR         Pain     Pain Location  Neck;Head  -HR      Pain at Present  5;6  -HR      Pain at Best  3  -HR      Pain at Worst  8  -HR      Pain Frequency  Constant/continuous  -HR      Pain Description  Tightness;Throbbing;Sore;Numbness;Burning;Headache  -HR      What Performance Factors Make the Current Problem(s) WORSE?  More painful first thing in the morning but also gets worse through the day if he moves around very much  -HR      What Performance Factors Make the Current Problem(s) BETTER?  rest, reclining, NSAIDs  -HR      Pain Comments  The headache is nearly constant every day. He has a feeing of fullness and pulsating at the base of his head  -HR      Is your sleep disturbed?  Yes  -HR      Is medication used to assist with sleep?  No  -HR      What position do you sleep in?  Right sidelying;Left sidelying  -HR      Difficulties at work?  unable to work at this time  -HR         Fall Risk Assessment    Any falls in the past year:  No  -HR         Services    Prior Rehab/Home Health Experiences  No  -HR      Are you currently receiving Home Health services  No  -HR      Do you plan to receive Home Health services in the near future  No  -HR         Daily Activities    Primary Language  English  -HR      Barriers to learning  None  -HR      Pt Participated in POC and Goals  Yes  -HR         Safety    Are you being hurt, hit, or frightened by anyone at home or in your life?  No  -HR      Are you being neglected by a caregiver  No  -HR        User Key  (r) = Recorded By, (t) = Taken By, (c) = Cosigned By    Initials Name Provider Type    HR Ella Sloan, PT, DPT, CLT-CANDI Physical Therapist              PT Neuro     Row Name 02/03/20 0900             Subjective Comments    Subjective Comments  He is not used to sitting around but he has been having a hard time with this injury.  -HR         Vision-Basic Assessment    Current Vision  Wears glasses only for reading  -HR       Patient Visual Report  Eye fatigue/eye pain/headache;Blurring of vision when changing focal distance  -HR         Cognition    Overall Cognitive Status  WFL  -HR      Arousal/Alertness  Appropriate responses to stimuli  -HR      Orientation Level  Oriented X4  -HR      Safety Judgment  Good awareness of safety precautions  -HR      Deficits  Fully aware of deficits  -HR         Sensation    Additional Comments  Some decreased sensitivity to the central suboccipital region and some of posterior scalp  -HR         Posture/Observations    Alignment Options  Forward head;Rounded shoulders  -HR      Forward Head  Increased  -HR      Rounded Shoulders  Moderate  -HR      Observations  Edema;Muscle spasm  -HR      Posture/Observations Comments  Rests in slight R cervical rotation. Tissues along R neck are soft with some swelling compared to the L.   -HR         General ROM    Head/Neck/Trunk  Neck Extension;Neck Flexion;Neck Lt Lateral Flexion;Neck Rt Lateral Flexion;Neck Lt Rotation;Neck Rt Rotation  -HR         Head/Neck/Trunk    Neck Extension AROM  17 tightness on R but more comfortable than flexion  -HR      Neck Flexion AROM  16 uncomfortable pull at R base of skull  -HR      Neck Lt Lateral Flexion AROM  40 pulling on R nothing on L  -HR      Neck Rt Lateral Flexion AROM  30 no symptoms on L. Still uncomfortable on R  -HR      Neck Lt Rotation AROM  30 restricted by tightness on R  -HR      Neck Rt Rotation AROM  50 > then L rotation but pain at end range worse   -HR      Head/Neck/Trunk Comments  All symptoms were on the R side of the neck and base of skull  -HR         MMT (Manual Muscle Testing)    Rt Lower Ext  Rt Hip ABduction  -HR      Lt Lower Ext  Lt Hip ABduction  -HR      General MMT Comments  Strong in all extremities when isolated for testing.   -HR         MMT Right Lower Ext    Rt Hip ABduction MMT, Gross Movement  (4+/5) good plus  -HR      Rt Lower Extremity Comments   isolated strength testing  showed strength equal to L even though he is not putting as much weight through the RLE when standing   -HR         MMT Left Lower Ext    Lt Hip ABduction MMT, Gross Movement  (4+/5) good plus  -HR         Mobility Assessment/Intervention    Extremity Weight-bearing Status  --  -HR         Gait/Stairs Assessment/Training    Comment (Gait/Stairs)  He is able to prevent falls but he does tend to lean L as he is putting more weight through the LLE with all standing activities.   -HR         Balance Skills Training    Standing-Balance Activities  Feet together;Forward lean;Single Limb Stance;Tandem Stance  -HR      SLS  LLE hip strategy with arms out to balance tamra. 5 sec. Unable to balance on RLE >2 sec  -HR      Sharpened Rhomberg  L foot in back: held 8 sec with noted trembling and increased weight bearing through LLE. Unable to hold position with R foot in back >3 sec.   -HR      Hip Strategy Assessment (Balance)  Relies on this to balance- mainly lateral leans vs. AP  -HR        User Key  (r) = Recorded By, (t) = Taken By, (c) = Cosigned By    Initials Name Provider Type    HR Ella Sloan, PT, DPT, CLT-CANDI Physical Therapist          Lymphedema     Row Name 02/03/20 0900             Subjective Pain    Able to rate subjective pain?  yes  -HR      Pre-Treatment Pain Level  5  -HR        User Key  (r) = Recorded By, (t) = Taken By, (c) = Cosigned By    Initials Name Provider Type    HR Ella Sloan, PT, DPT, CLT-CANDI Physical Therapist              Therapy Education  Education Details: lie supine with roll to neck to encourage neutral alignnment. Sit with arms resting on pillows and perform gentle cervical ROM  Given: HEP, Posture/body mechanics  Program: New  How Provided: Demonstration, Verbal  Provided to: Patient  Level of Understanding: Verbalized    PT OP Goals     Row Name 02/03/20 1000          PT Short Term Goals    STG Date to Achieve  03/04/20  -HR     STG 1  Patient will report  decreased frequency of headaches with some headache free days.   -HR     STG 1 Progress  New  -HR     STG 2  Patient will perform equal cervical rotation in both directions without pain.   -HR     STG 2 Progress  New  -HR     STG 3  Patient will be able to SLS on each LE >10 sec.  -HR     STG 3 Progress  New  -HR        Long Term Goals    LTG Date to Achieve  04/03/20  -HR     LTG 1  Patient will be independent with a comprehensive postural HEP.   -HR     LTG 1 Progress  New  -HR     LTG 2  Patient will have no more than 1 headache per week.   -HR     LTG 2 Progress  New  -HR     LTG 3  All cervical ROM will be WFL and pain free.   -HR     LTG 3 Progress  New  -HR     LTG 4  Patient will be able to walk balance beam with no support or LOB.  -HR     LTG 4 Progress  New  -HR        Time Calculation    PT Goal Re-Cert Due Date  03/04/20  -HR       User Key  (r) = Recorded By, (t) = Taken By, (c) = Cosigned By    Initials Name Provider Type    HR Ella Sloan, PT, DPT, CLT-CANDI Physical Therapist          PT Assessment/Plan     Row Name 02/03/20 0900          PT Assessment    Functional Limitations  Decreased safety during functional activities;Limitations in functional capacity and performance;Performance in work activities;Performance in leisure activities  -HR     Impairments  Pain;Impaired postural alignment;Balance  -HR     Assessment Comments  Mr. Molina is having neck pain and headaches following a head injury on 1/8/2020. Assessment today and his report of how the hammer hit his hard hat shows that the injury to the neck was very much like a whiplash injury. In his case, the impact came from the R side causing the cervical muscles to suffer a strong quick stretch which resulted in tearing multiple muscles fibers and causing them to shorten and spasm. His resting head position is now slightly rotated to the R due to the tightness which is part of the problem affecting his balance. He very well may still  have some neurological issues also causing some balance issues and some reported difficulty with short term memory as well. Our focus will first be on improving his postural alignment to promote a more neutral cervical spine which in turn will decrease the strain on the suboccipital muscles. Then once he regain the alignment and motion, balance can be addressed as he has to have very good balance to do some of his job duties. I do anticipate that he will improve with treatment. Thank you for the referral.  -HR     Rehab Potential  Excellent  -HR     Patient/caregiver participated in establishment of treatment plan and goals  Yes  -HR     Patient would benefit from skilled therapy intervention  Yes  -HR        PT Plan    PT Frequency  3x/week;2x/week  -HR     Predicted Duration of Therapy Intervention (Therapy Eval)  8 weeks  -HR     Planned CPT's?  PT RE-EVAL: 89880;PT THER PROC EA 15 MIN: 73323;PT THER ACT EA 15 MIN: 04842;PT MANUAL THERAPY EA 15 MIN: 98177;PT NEUROMUSC RE-EDUCATION EA 15 MIN: 04647;PT GAIT TRAINING EA 15 MIN: 40917;PT SELF CARE/HOME MGMT/TRAIN EA 15: 26659;PT ELECTRICAL STIM ATTD EA 15 MIN: 58341  -HR     PT Plan Comments  PT to see 3X/wk for the first 2-4 weeks and then decrease frequency as he improves and as he is able to maintain with a home program.   -HR       User Key  (r) = Recorded By, (t) = Taken By, (c) = Cosigned By    Initials Name Provider Type    HR Ella Sloan, PT, DPT, CLTSHRAVANA Physical Therapist             OP Exercises     Row Name 02/03/20 0900             Subjective Comments    Subjective Comments  He is not used to sitting around but he has been having a hard time with this injury.  -HR         Subjective Pain    Able to rate subjective pain?  yes  -HR      Pre-Treatment Pain Level  5  -HR        User Key  (r) = Recorded By, (t) = Taken By, (c) = Cosigned By    Initials Name Provider Type    HR Ella Sloan, PT, DPT, CLT-CANDI Physical Therapist                       Outcome Measure Options: Urrutia Balance  Urrutia Balance Scale  Sitting to Standing: able to stand independently using hands  Standing Unsupported: able to stand safely for 2 minutes  Sitting with Back Unsupported but Feet Supported on Floor or on Stool: able to sit safely and securely for 2 minutes  Standing to Sitting: sits safely with minimal use of hands  Transfers: able to transfer safely with minor use of hands  Standing Unsupported with Eyes Closed: able to stand 10 seconds safely  Standing Unsupported with Feet Together: able to place feet together independently and stand 1 minute safely  Reaching Forward with Outstretched Arm While Standing: can reach forward 12 cm (5 inches)   Object From the Floor From a Standing Position: able to  object but needs supervision  Turning to Look Behind Over Left and Right Shoulders While Standing: looks behind one side only other side shows less weight shift  Turn 360 Degrees: able to turn 360 degrees safely in 4 seconds or less  Place Alternate Foot on Step or Stool While Standing Unsupported: able to stand independently and complete 8 steps in > 20 seconds  Standing Unsupported with One Foot in Front: needs help to step but can hold 15 seconds  Standing on One Leg: able to lift leg independently and hold >/equal to 3 seconds  Urrutia Total Score: 46       Time Calculation:           PT G-Codes  Outcome Measure Options: Urrutia Balance  Urrutia Total Score: 46         Ella Sloan, PT, DPT, CLT-CANDI  2/3/2020

## 2020-02-10 ENCOUNTER — TREATMENT (OUTPATIENT)
Dept: PHYSICAL THERAPY | Facility: CLINIC | Age: 55
End: 2020-02-10

## 2020-02-10 DIAGNOSIS — F07.81 POST CONCUSSION SYNDROME: ICD-10-CM

## 2020-02-10 DIAGNOSIS — M54.2 CERVICAL PAIN: Primary | ICD-10-CM

## 2020-02-10 PROCEDURE — 97140 MANUAL THERAPY 1/> REGIONS: CPT | Performed by: PHYSICAL THERAPIST

## 2020-02-10 NOTE — PROGRESS NOTES
Outpatient Physical Therapy Neuro Treatment Note       Patient Name: Urban Molina  : 1965  MRN: [id number]  Today's Date: 2/10/2020      Visit Date: 02/10/2020    Visit Dx:    ICD-10-CM ICD-9-CM   1. Cervical pain M54.2 723.1   2. Post concussion syndrome F07.81 310.2       There is no problem list on file for this patient.                      PT Assessment/Plan     Row Name 02/10/20 0754          PT Assessment    Assessment Comments  Today was Mr. Molina's first visit since his initial evaluation, therefore, no goals have been met at this time. We focused on reducing soft tissue restrictions in his R cervical region and also increasing ROM. Pt. had some guarding but was able to tolerate STM. Pt. denied significant pain throughout treatment but described feeling a good kind of pain. Pt. was educated on the purpose and benefits of his HEP given thus far and encouraged to comply.   -        PT Plan    PT Plan Comments  Follow up on his HEP compliance. Continue to work on increasing his cervical ROM.   -       User Key  (r) = Recorded By, (t) = Taken By, (c) = Cosigned By    Initials Name Provider Type     Mandy Bishop PTA Physical Therapy Assistant             OP Exercises     Row Name 02/10/20 0754             Subjective Comments    Subjective Comments  Pt. reports feeling tight in his R neck. He also states it feels hot and numb. He doesn't sleep very well due to the inability to find a good position. He has been substituting the towel roll technique w/ the back of his couch and states he wasn't really interested in the unweighted cervical rotations because they didn't help any of his symptoms.   -         Subjective Pain    Able to rate subjective pain?  yes  -      Pre-Treatment Pain Level  -- 5-6  -      Post-Treatment Pain Level  5  -AH         Total Minutes    46529 - PT Manual Therapy Minutes  50  -AH        User Key  (r) = Recorded By, (t) = Taken By, (c) = Cosigned By    Initials  Name Provider Type    Mandy Deal PTA Physical Therapy Assistant                     Manual Rx (last 36 hours)      Manual Treatments     Row Name 02/10/20 0754             Total Minutes    33913 - PT Manual Therapy Minutes  50  -         Manual Rx 1    Manual Rx 1 Location  upper thoracic and CT  -      Manual Rx 1 Type  IASTM w/ blue ridged roller, prone  -         Manual Rx 2    Manual Rx 2 Location  R UT, LS, and suboccipitals  -      Manual Rx 2 Type  STM w/ massage cream, prone  -         Manual Rx 3    Manual Rx 3 Location  cervical  -      Manual Rx 3 Type  manual distraction w/ kellie side bends, sustained  -         Manual Rx 4    Manual Rx 4 Location  cervical  -      Manual Rx 4 Type  PROM into kellie rotation and lateral flexion  -        User Key  (r) = Recorded By, (t) = Taken By, (c) = Cosigned By    Initials Name Provider Type    MARGARET Mandy Bishop PTA Physical Therapy Assistant          PT OP Goals     Row Name 02/10/20 0754          PT Short Term Goals    STG Date to Achieve  03/04/20  -     STG 1  Patient will report decreased frequency of headaches with some headache free days.   -     STG 1 Progress  New  -     STG 2  Patient will perform equal cervical rotation in both directions without pain.   -     STG 2 Progress  Ongoing  -     STG 2 Progress Comments  Addressed w/ PROM into cervical rotation bilaterally  -     STG 3  Patient will be able to SLS on each LE >10 sec.  -     STG 3 Progress  New  -        Long Term Goals    LTG Date to Achieve  04/03/20  -     LTG 1  Patient will be independent with a comprehensive postural HEP.   -     LTG 1 Progress  Ongoing  -     LTG 1 Progress Comments  Educated pt. on the importance of his HEP and that unweighted cervical rotations were not for pain relief but rather to increase ROM.   -     LTG 2  Patient will have no more than 1 headache per week.   -     LTG 2 Progress  New  -     LTG 3  All cervical  ROM will be WFL and pain free.   -     LTG 3 Progress  New  -     LTG 4  Patient will be able to walk balance beam with no support or LOB.  -     LTG 4 Progress  New  -        Time Calculation    PT Goal Re-Cert Due Date  03/04/20  -       User Key  (r) = Recorded By, (t) = Taken By, (c) = Cosigned By    Initials Name Provider Type    Mandy Deal PTA Physical Therapy Assistant          Therapy Education  Education Details: Educated pt. on purpose and benefits of HEP components given at al.   Given: HEP, Symptoms/condition management, Posture/body mechanics  Program: New, Reinforced  How Provided: Verbal, Demonstration  Provided to: Patient  Level of Understanding: Verbalized              Time Calculation:   Total Timed Code Minutes- PT: 50 minute(s)                 Mandy Bishop PTA  2/10/2020

## 2020-02-19 ENCOUNTER — OFFICE VISIT (OUTPATIENT)
Dept: INTERNAL MEDICINE | Facility: CLINIC | Age: 55
End: 2020-02-19

## 2020-02-19 VITALS
RESPIRATION RATE: 18 BRPM | HEART RATE: 63 BPM | DIASTOLIC BLOOD PRESSURE: 78 MMHG | SYSTOLIC BLOOD PRESSURE: 116 MMHG | BODY MASS INDEX: 22.08 KG/M2 | WEIGHT: 172.06 LBS | OXYGEN SATURATION: 98 % | HEIGHT: 74 IN

## 2020-02-19 DIAGNOSIS — R41.840 CONCENTRATION DEFICIT: ICD-10-CM

## 2020-02-19 DIAGNOSIS — F07.81 POST CONCUSSION SYNDROME: Primary | ICD-10-CM

## 2020-02-19 PROCEDURE — 99214 OFFICE O/P EST MOD 30 MIN: CPT | Performed by: FAMILY MEDICINE

## 2020-02-19 RX ORDER — VENLAFAXINE HYDROCHLORIDE 37.5 MG/1
37.5 CAPSULE, EXTENDED RELEASE ORAL DAILY
Qty: 30 CAPSULE | Refills: 1 | Status: SHIPPED | OUTPATIENT
Start: 2020-02-19 | End: 2020-05-19 | Stop reason: SDUPTHER

## 2020-02-19 RX ORDER — BACLOFEN 10 MG/1
10 TABLET ORAL 3 TIMES DAILY PRN
COMMUNITY
End: 2020-05-26

## 2020-02-19 RX ORDER — IBUPROFEN 200 MG
200 TABLET ORAL EVERY 6 HOURS PRN
COMMUNITY

## 2020-02-19 NOTE — PROGRESS NOTES
"CC:   Chief Complaint   Patient presents with   • Post concussion syndrome     Pt states he has some dizziness, pain, balance issues and headaches.       History:  Urban Molina is a 55 y.o. male who presents today for follow-up for evaluation of the above:    Still having occasional dizziness, headaches, occasional poor balance but overall feels a little better than last visit.  Got approved for 1 day a week physical therapy.  Does not feel like he needs muscle relaxers, uses over-the-counter medications for as needed pain support.  His wife does report that he has concentration issues and he can be frequently irritable with headaches, which is worse than his usual baseline.    ROS:  Review of Systems   Neurological: Positive for dizziness and headaches. Negative for speech difficulty.   Psychiatric/Behavioral: Positive for agitation and behavioral problems.       Mr. Molian  reports that he has been smoking cigarettes. He has a 10.00 pack-year smoking history. His smokeless tobacco use includes snuff. He reports that he drinks alcohol. He reports that he has current or past drug history. Drugs: Amphetamines, Marijuana, Cocaine, and Other.      Current Outpatient Medications:   •  baclofen (LIORESAL) 10 MG tablet, Take 10 mg by mouth 3 (Three) Times a Day As Needed for Muscle Spasms., Disp: , Rfl:   •  ibuprofen (ADVIL,MOTRIN) 200 MG tablet, Take 200 mg by mouth Every 6 (Six) Hours As Needed for Mild Pain ., Disp: , Rfl:   •  venlafaxine XR (EFFEXOR-XR) 37.5 MG 24 hr capsule, Take 1 capsule by mouth Daily., Disp: 30 capsule, Rfl: 1      OBJECTIVE:  /78 (BP Location: Left arm, Patient Position: Sitting, Cuff Size: Adult)   Pulse 63   Resp 18   Ht 188 cm (74\")   Wt 78 kg (172 lb 1 oz)   SpO2 98%   BMI 22.09 kg/m²    Physical Exam   Constitutional: He is oriented to person, place, and time. No distress.   HENT:   Head: Normocephalic and atraumatic.   Nose: Nose normal.   Eyes: Conjunctivae are normal. " Right eye exhibits no discharge. Left eye exhibits no discharge. No scleral icterus.   Neck: No tracheal deviation present.   Pulmonary/Chest: Effort normal.   Neurological: He is alert and oriented to person, place, and time.   Skin: Skin is warm and dry. He is not diaphoretic. No pallor.   Psychiatric: He has a normal mood and affect. His behavior is normal. Judgment and thought content normal.   Nursing note and vitals reviewed.      Assessment/Plan     Diagnosis Plan   1. Post concussion syndrome  venlafaxine XR (EFFEXOR-XR) 37.5 MG 24 hr capsule   2. Concentration deficit  venlafaxine XR (EFFEXOR-XR) 37.5 MG 24 hr capsule   Continue with physical therapy and OTC pain relief  Effexor trial to reduce agitation and improve concentration.  Start 37.5 mg daily but could increase to 75 mg daily if needed.    An After Visit Summary was printed and given to the patient at discharge.  Return if symptoms worsen or fail to improve. Sooner if problems arise.         Urban Coello D.O.  [facility]  [facility]

## 2020-03-02 ENCOUNTER — TREATMENT (OUTPATIENT)
Dept: PHYSICAL THERAPY | Facility: CLINIC | Age: 55
End: 2020-03-02

## 2020-03-02 DIAGNOSIS — M54.2 CERVICAL PAIN: Primary | ICD-10-CM

## 2020-03-02 DIAGNOSIS — F07.81 POST CONCUSSION SYNDROME: ICD-10-CM

## 2020-03-02 PROCEDURE — 97140 MANUAL THERAPY 1/> REGIONS: CPT | Performed by: PHYSICAL THERAPIST

## 2020-03-02 NOTE — PROGRESS NOTES
Outpatient Physical Therapy Ortho Treatment Note       Patient Name: Urban Molina  : 1965  MRN: [id number]  Today's Date: 3/2/2020      Visit Date: 2020    Visit Dx:    ICD-10-CM ICD-9-CM   1. Cervical pain M54.2 723.1   2. Post concussion syndrome F07.81 310.2       There is no problem list on file for this patient.       Past Medical History:   Diagnosis Date   • Back pain    • Blurred vision    • Head injury due to trauma    • Inguinal hernia, right     sched repair   • Painful urination         Past Surgical History:   Procedure Laterality Date   • COLONOSCOPY     • HERNIA REPAIR Right     inguinal   • INGUINAL HERNIA REPAIR Right 2016    Procedure: INGUINAL HERNIA REPAIR;  Surgeon: Matti Kelley MD;  Location: [facility];  Service:    • NO PAST SURGERIES                         PT Assessment/Plan     Row Name 20 1400          PT Assessment    Assessment Comments  Patient's pain is slightly better today.  He is still restricted and guarded with L rotation.  He is able to tolerate gentle PROM into L rotation, needs verbal cues to relax. Continue to stress good posture.   -AL        PT Plan    PT Plan Comments  Continue with manual therapy to decrease pain, bolster HEP next visit for cervical and scapular strengthening   -AL       User Key  (r) = Recorded By, (t) = Taken By, (c) = Cosigned By    Initials Name Provider Type    Monica Payne PTA, CLT-LANA Physical Therapy Assistant            OP Exercises     Row Name 20 1400             Subjective Comments    Subjective Comments  He has headaches that comes and goes.  He has pain at night and has trouble sleeping with increase pain in the morning.  This will loosen up some, but will tighten again.  His balance is still off.  He fell into the wall three times when feeding the dog, and then fell forward each time.  He leaned against the wall when he fell forward, he did not hit the ground.    -AL         Subjective Pain     Able to rate subjective pain?  yes  -AL      Pre-Treatment Pain Level  5 4-5/10  -AL      Subjective Pain Comment  R neck  -AL         Total Minutes    58965 - PT Manual Therapy Minutes  43  -AL        User Key  (r) = Recorded By, (t) = Taken By, (c) = Cosigned By    Initials Name Provider Type    NEY Haley Monica OLIVA, PTA, CLT-CANDI Physical Therapy Assistant                      Manual Rx (last 36 hours)      Manual Treatments     Row Name 03/02/20 1400             Total Minutes    54558 - PT Manual Therapy Minutes  43  -AL         Manual Rx 1    Manual Rx 1 Location  Suboccipital area  -AL      Manual Rx 1 Type  Suboccipital release  -AL         Manual Rx 2    Manual Rx 2 Location  R UT, LS, and suboccipitals  -AL      Manual Rx 2 Type  STM w/ massage cream, prone  -AL         Manual Rx 3    Manual Rx 3 Location  cervical  -AL      Manual Rx 3 Type  manual distraction w/ kellie side bends, sustained  -AL         Manual Rx 4    Manual Rx 4 Location  cervical  -AL      Manual Rx 4 Type  PROM into kellie rotation and lateral flexion  -AL         Manual Rx 5    Manual Rx 5 Location  Thoracic spine  -AL      Manual Rx 5 Type  Prone extension mobs  -AL        User Key  (r) = Recorded By, (t) = Taken By, (c) = Cosigned By    Initials Name Provider Type    NEY Haley Monica OLIVA, PTA, CLT-CANDI Physical Therapy Assistant          PT OP Goals     Row Name 03/02/20 1400          PT Short Term Goals    STG Date to Achieve  03/04/20  -AL     STG 1  Patient will report decreased frequency of headaches with some headache free days.   -AL     STG 1 Progress  Ongoing  -AL     STG 1 Progress Comments  Slightly better, uses Ibuprofen and Tylenol  -AL     STG 2  Patient will perform equal cervical rotation in both directions without pain.   -AL     STG 2 Progress  Ongoing  -AL     STG 2 Progress Comments  No problem with R rotation, increase pain and guarding with L rotation.   -AL     STG 3  Patient will be able to SLS on each LE >10 sec.  -AL      STG 3 Progress  New  -AL        Long Term Goals    LTG Date to Achieve  04/03/20  -AL     LTG 1  Patient will be independent with a comprehensive postural HEP.   -AL     LTG 1 Progress  Ongoing  -AL     LTG 1 Progress Comments  Works on this in sitting, sits sideways to the R when driving.  -AL     LTG 2  Patient will have no more than 1 headache per week.   -AL     LTG 2 Progress  Ongoing  -AL     LTG 2 Progress Comments  He has a headache every day  -AL     LTG 3  All cervical ROM will be WFL and pain free.   -AL     LTG 3 Progress  New  -AL     LTG 4  Patient will be able to walk balance beam with no support or LOB.  -AL     LTG 4 Progress  New  -AL        Time Calculation    PT Goal Re-Cert Due Date  03/04/20  -AL       User Key  (r) = Recorded By, (t) = Taken By, (c) = Cosigned By    Initials Name Provider Type    Monica Payne PTA, CLT-LANA Physical Therapy Assistant          Therapy Education  Education Details: Conitnue to work on HEP  Given: HEP  Program: Reinforced  How Provided: Verbal  Provided to: Patient  Level of Understanding: Verbalized              Time Calculation:                    Monica Haley PTA, CLT-LANA  3/2/2020

## 2020-03-04 ENCOUNTER — TREATMENT (OUTPATIENT)
Dept: PHYSICAL THERAPY | Facility: CLINIC | Age: 55
End: 2020-03-04

## 2020-03-04 ENCOUNTER — OFFICE VISIT (OUTPATIENT)
Dept: INTERNAL MEDICINE | Facility: CLINIC | Age: 55
End: 2020-03-04

## 2020-03-04 VITALS
WEIGHT: 170 LBS | OXYGEN SATURATION: 98 % | SYSTOLIC BLOOD PRESSURE: 120 MMHG | HEART RATE: 55 BPM | RESPIRATION RATE: 18 BRPM | HEIGHT: 74 IN | DIASTOLIC BLOOD PRESSURE: 80 MMHG | BODY MASS INDEX: 21.82 KG/M2

## 2020-03-04 DIAGNOSIS — M54.2 CERVICAL PAIN: Primary | ICD-10-CM

## 2020-03-04 DIAGNOSIS — F07.81 POST CONCUSSION SYNDROME: Primary | ICD-10-CM

## 2020-03-04 DIAGNOSIS — F07.81 POST CONCUSSION SYNDROME: ICD-10-CM

## 2020-03-04 PROCEDURE — 97140 MANUAL THERAPY 1/> REGIONS: CPT | Performed by: PHYSICAL THERAPIST

## 2020-03-04 PROCEDURE — 99214 OFFICE O/P EST MOD 30 MIN: CPT | Performed by: FAMILY MEDICINE

## 2020-03-04 PROCEDURE — 97110 THERAPEUTIC EXERCISES: CPT | Performed by: PHYSICAL THERAPIST

## 2020-03-04 NOTE — PROGRESS NOTES
"Outpatient Physical Therapy Ortho Progress Note       Patient Name: Urban Molina  : 1965  MRN: [id number]  Today's Date: 3/4/2020      Visit Date: 2020    Visit Dx:    ICD-10-CM ICD-9-CM   1. Cervical pain M54.2 723.1   2. Post concussion syndrome F07.81 310.2       There is no problem list on file for this patient.       Past Medical History:   Diagnosis Date   • Back pain    • Blurred vision    • Head injury due to trauma    • Inguinal hernia, right     sched repair   • Painful urination         Past Surgical History:   Procedure Laterality Date   • COLONOSCOPY     • HERNIA REPAIR Right 2010    inguinal   • INGUINAL HERNIA REPAIR Right 2016    Procedure: INGUINAL HERNIA REPAIR;  Surgeon: Matti Kelley MD;  Location: [facility];  Service:    • NO PAST SURGERIES                         PT Assessment/Plan     Row Name 20 1045          PT Assessment    Functional Limitations  Decreased safety during functional activities;Limitations in functional capacity and performance;Performance in work activities;Performance in leisure activities  -HR     Impairments  Pain;Impaired postural alignment;Balance  -HR     Assessment Comments  Today goals were assessed for progress note. At this time, Mr. Molina has partially met 1 of his goals, however, this is only his 3rd visit since the initial evaluation. He had a lapse in care due to insurance being slow to approve more visits. His primary complaints consists of HA and balance issues. His cervical ROM has improved but his HA are unchanged. In sitting, he does demonstrate forward flexed posture w/ protracted scapula. His Urrutia Balance score has slightly improved. Pt. completed the Headache Disability Index today and scored an 68 which indicates \"severe disability\". When he stood up at conclusion of treatment, he reported dizziness and staggered. He feels 40% improvement since beginning therapy. Pt. would benefit from attending therapy 3x/week for " several weeks to continue to address these symptoms.   -     Rehab Potential  Excellent  -HR     Patient/caregiver participated in establishment of treatment plan and goals  Yes  -HR     Patient would benefit from skilled therapy intervention  Yes  -HR        PT Plan    PT Frequency  3x/week  -HR     Predicted Duration of Therapy Intervention (Therapy Eval)  4 weeks  -HR     Planned CPT's?  PT RE-EVAL: 90141;PT THER PROC EA 15 MIN: 51793;PT THER ACT EA 15 MIN: 19030;PT MANUAL THERAPY EA 15 MIN: 55515;PT NEUROMUSC RE-EDUCATION EA 15 MIN: 12507;PT GAIT TRAINING EA 15 MIN: 48924;PT SELF CARE/HOME MGMT/TRAIN EA 15: 44645;PT ELECTRICAL STIM ATTD EA 15 MIN: 94500  -HR     PT Plan Comments  Continue to increase thoracic and cervical mobility and postural strength. Work on balance.   -       User Key  (r) = Recorded By, (t) = Taken By, (c) = Cosigned By    Initials Name Provider Type    HR Ella Sloan, PT, DPT, CLT-CANDI Physical Therapist     Mandy Bishop PTA Physical Therapy Assistant            OP Exercises     Row Name 03/04/20 1045             Subjective Comments    Subjective Comments  Pt. reports feeling his pain going into his R ear. If his HA gets bad enough, it will travel to his R eye. He has difficulty sleeping due to positioning.   -         Subjective Pain    Able to rate subjective pain?  yes  -      Pre-Treatment Pain Level  4  -         Total Minutes    85475 - PT Therapeutic Exercise Minutes  36  -AH      31045 - PT Manual Therapy Minutes  24  -AH         Exercise 1    Exercise Name 1  supine chin tucks  -      Cueing 1  Verbal;Tactile;Demo  -      Sets 1  2  -      Reps 1  10  -AH      Additional Comments  increased HA  -         Exercise 2    Exercise Name 2  seated scapular retractions  -      Cueing 2  Verbal;Tactile;Demo  -      Sets 2  2  -AH      Reps 2  10  -AH      Additional Comments  Added to HEP  -AH         Exercise 3    Exercise Name 3  Reviewed B UT & LS  stretches  -      Additional Comments  Added to HEP  -        User Key  (r) = Recorded By, (t) = Taken By, (c) = Cosigned By    Initials Name Provider Type    Mandy Deal PTA Physical Therapy Assistant                      Manual Rx (last 36 hours)      Manual Treatments     Row Name 03/04/20 1045             Total Minutes    72394 - PT Manual Therapy Minutes  24  -         Manual Rx 1    Manual Rx 1 Location  thoracic  -      Manual Rx 1 Type  IASTM w/ blue ridged roller, prone  -         Manual Rx 2    Manual Rx 2 Location  thoracic and CT  -      Manual Rx 2 Type  extension mobilizations, prone  -         Manual Rx 3    Manual Rx 3 Location  B UT, LS, rhomboids, & suboccipitals  -      Manual Rx 3 Type  STM w/ massage cream, prone  -         Manual Rx 4    Manual Rx 4 Location  occipital protuberances  -      Manual Rx 4 Type  suboccipital release  -        User Key  (r) = Recorded By, (t) = Taken By, (c) = Cosigned By    Initials Name Provider Type     Mandy Bishop PTA Physical Therapy Assistant          PT OP Goals     Row Name 03/04/20 1045          PT Short Term Goals    STG Date to Achieve  03/04/20  -     STG 1  Patient will report decreased frequency of headaches with some headache free days.   -     STG 1 Progress  Ongoing  -     STG 1 Progress Comments  He has headaches every evening. He takes Aleve.   -     STG 2  Patient will perform equal cervical rotation in both directions without pain.   -     STG 2 Progress  Ongoing;Progressing  -     STG 2 Progress Comments  L 55 deg; R 51 deg; Hurts to look R at end range.   -     STG 3  Patient will be able to SLS on each LE >10 sec.  -     STG 3 Progress  Ongoing;Progressing;Partially Met  -     STG 3 Progress Comments  > 10 sec but was unstable and demonstrated increased trunk compensations   -        Long Term Goals    LTG Date to Achieve  04/03/20  -     LTG 1  Patient will be independent with a  comprehensive postural HEP.   -     LTG 1 Progress  Ongoing;Progressing  -     LTG 1 Progress Comments  Pt. reports performing inital HEP whenever he is sitting around watching TV. HEP was bolstered today.  -     LTG 2  Patient will have no more than 1 headache per week.   -     LTG 2 Progress  Ongoing  -     LTG 2 Progress Comments  see STG #1  -     LTG 3  All cervical ROM will be WFL and pain free.   -     LTG 3 Progress  Ongoing  -     LTG 3 Progress Comments  flex: 21 degrees ext: 16 degrees  -     LTG 4  Patient will be able to walk balance beam with no support or LOB.  -     LTG 4 Progress  Ongoing;Progressing  -     LTG 4 Progress Comments  1 LOB on beam today  -        Time Calculation    PT Goal Re-Cert Due Date  04/03/20  -       User Key  (r) = Recorded By, (t) = Taken By, (c) = Cosigned By    Initials Name Provider Type    Mandy Deal PTA Physical Therapy Assistant          Therapy Education  Education Details: Added scap squeezes & B UT & LS stretches to HEP  Given: HEP  Program: New  How Provided: Verbal, Demonstration, Written  Provided to: Patient  Level of Understanding: Verbalized, Demonstrated    Outcome Measure Options: Urrutia Balance, Headache Disability Index  Urrutia Balance Scale  Sitting to Standing: able to stand without using hands and stabilize independently  Standing Unsupported: able to stand safely for 2 minutes  Sitting with Back Unsupported but Feet Supported on Floor or on Stool: able to sit safely and securely for 2 minutes  Standing to Sitting: sits safely with minimal use of hands  Transfers: able to transfer safely with minor use of hands  Standing Unsupported with Eyes Closed: able to stand 10 seconds safely  Standing Unsupported with Feet Together: able to place feet together independently and stand 1 minute safely  Reaching Forward with Outstretched Arm While Standing: can reach forward confidently 25 cm (10 inches)   Object From the Floor  From a Standing Position: able to  object but needs supervision  Turning to Look Behind Over Left and Right Shoulders While Standing: looks behind from both sides and weight shifts well  Turn 360 Degrees: able to turn 360 degrees safely one side only 4 seconds or less  Place Alternate Foot on Step or Stool While Standing Unsupported: able to stand independently and complete 8 steps in > 20 seconds  Standing Unsupported with One Foot in Front: needs help to step but can hold 15 seconds  Standing on One Leg: able to lift leg independently and hold 5-10 seconds  Urrutia Total Score: 49  Headache Disability Index  Headache Disability Index Comments: 68(indicates severe disability)      Time Calculation:   Total Timed Code Minutes- PT: 60 minute(s)      PT G-Codes  Outcome Measure Options: Urrutia Balance, Headache Disability Index  Urrutia Total Score: 49         Ella Sloan, PT, DPT, CLT-CANDI  3/4/2020

## 2020-03-04 NOTE — PROGRESS NOTES
"CC:   Chief Complaint   Patient presents with   • Post concussion syndrome     Follow-up.       History:  Urban Molina is a 55 y.o. male who presents today for follow-up for evaluation of the above:    Still having dizziness following concussion, falls over with ADLs, recently started PT, says that he is still frequently agitated, previously unable to refill effexor due to insurance difficulty with workman's comp     ROS:  Review of Systems   Musculoskeletal: Myalgias: a little better with PT.   Neurological: Positive for dizziness and headaches.   Psychiatric/Behavioral: Positive for agitation.       Mr. Molina  reports that he has been smoking cigarettes. He has a 10.00 pack-year smoking history. His smokeless tobacco use includes snuff. He reports that he drinks alcohol. He reports that he has current or past drug history. Drugs: Amphetamines, Marijuana, Cocaine, and Other.      Current Outpatient Medications:   •  Acetaminophen (TYLENOL) 325 MG capsule, Take 1 tablet by mouth As Needed., Disp: , Rfl:   •  ibuprofen (ADVIL,MOTRIN) 200 MG tablet, Take 200 mg by mouth Every 6 (Six) Hours As Needed for Mild Pain ., Disp: , Rfl:   •  baclofen (LIORESAL) 10 MG tablet, Take 10 mg by mouth 3 (Three) Times a Day As Needed for Muscle Spasms., Disp: , Rfl:   •  venlafaxine XR (EFFEXOR-XR) 37.5 MG 24 hr capsule, Take 1 capsule by mouth Daily., Disp: 30 capsule, Rfl: 1      OBJECTIVE:  /80 (BP Location: Left arm, Patient Position: Sitting, Cuff Size: Adult)   Pulse 55   Resp 18   Ht 188 cm (74\")   Wt 77.1 kg (170 lb)   SpO2 98%   BMI 21.83 kg/m²    Physical Exam   Constitutional: He is oriented to person, place, and time. No distress.   HENT:   Head: Normocephalic and atraumatic.   Nose: Nose normal.   Eyes: Conjunctivae are normal. Right eye exhibits no discharge. Left eye exhibits no discharge. No scleral icterus.   Neck: No tracheal deviation present.   Pulmonary/Chest: Effort normal.   Neurological: He is " alert and oriented to person, place, and time.   Skin: Skin is warm and dry. He is not diaphoretic. No pallor.   Psychiatric: He has a normal mood and affect. His behavior is normal. Judgment and thought content normal.   Nursing note and vitals reviewed.      Assessment/Plan     Diagnosis Plan   1. Post concussion syndrome     continue PT  Continue effexor trial  F/u 4 weeks, wrote letter to refrain from work due to dizziness    An After Visit Summary was printed and given to the patient at discharge.  Return in about 4 weeks (around 4/1/2020). Sooner if problems arise.         Urban Coello D.O.  [facility]  Osteopathic Neuromusculoskeletal Medicine

## 2020-03-05 ENCOUNTER — TELEPHONE (OUTPATIENT)
Dept: INTERNAL MEDICINE | Facility: CLINIC | Age: 55
End: 2020-03-05

## 2020-03-05 NOTE — TELEPHONE ENCOUNTER
KATINA WITH [employer] IS CALLING.. PLEASE CALL HER BACK.. [phone]..... SHE NEEDS TO KNOW ABOUT ALL  OF HIS RESTRICTIONS AND FOLLOW UP APPOINTMENTS.

## 2020-03-06 ENCOUNTER — TREATMENT (OUTPATIENT)
Dept: PHYSICAL THERAPY | Facility: CLINIC | Age: 55
End: 2020-03-06

## 2020-03-06 DIAGNOSIS — F07.81 POST CONCUSSION SYNDROME: ICD-10-CM

## 2020-03-06 DIAGNOSIS — M54.2 CERVICAL PAIN: Primary | ICD-10-CM

## 2020-03-06 PROCEDURE — 97110 THERAPEUTIC EXERCISES: CPT | Performed by: PHYSICAL THERAPIST

## 2020-03-06 PROCEDURE — 97140 MANUAL THERAPY 1/> REGIONS: CPT | Performed by: PHYSICAL THERAPIST

## 2020-03-06 NOTE — PROGRESS NOTES
Outpatient Physical Therapy Ortho Treatment Note       Patient Name: Urban Molina  : 1965  MRN: [id number]  Today's Date: 3/6/2020      Visit Date: 2020    Visit Dx:    ICD-10-CM ICD-9-CM   1. Cervical pain M54.2 723.1   2. Post concussion syndrome F07.81 310.2       There is no problem list on file for this patient.       Past Medical History:   Diagnosis Date   • Back pain    • Blurred vision    • Head injury due to trauma    • Inguinal hernia, right     sched repair   • Painful urination         Past Surgical History:   Procedure Laterality Date   • COLONOSCOPY     • HERNIA REPAIR Right     inguinal   • INGUINAL HERNIA REPAIR Right 2016    Procedure: INGUINAL HERNIA REPAIR;  Surgeon: Matti Kelley MD;  Location: [facility];  Service:    • NO PAST SURGERIES                         PT Assessment/Plan     Row Name 20 0800          PT Assessment    Assessment Comments  Mr. Molina reports that he continues to see slight improvements in hsi posture and decrease in pain intensity. Today we continued to work on reducing soft tissue restriction and improvingthoracic mobility. His gaurding in Upper traps has decreased but he still has gaurding present in cervical paraspinals on the R. We continued with light scapular strengthening and he tolerated this well.   -TR        PT Plan    PT Plan Comments  Continue to work to decrease muscel gaurding in cervical parapsinals, spinal mobility, and postural strengthening.   -TR       User Key  (r) = Recorded By, (t) = Taken By, (c) = Cosigned By    Initials Name Provider Type    TR Tracy Cullen, MERVIN Physical Therapy Assistant            OP Exercises     Row Name 20 0800             Subjective Comments    Subjective Comments  Pt reports that he still has ain when waking up in the morning withpain and some new weakness in RUE. He   -TR         Subjective Pain    Able to rate subjective pain?  yes  -TR      Pre-Treatment Pain Level  4  -TR       Post-Treatment Pain Level  2  -TR      Subjective Pain Comment  R neck and shoulder   -TR         Total Minutes    43253 - PT Therapeutic Exercise Minutes  10  -TR      66758 - PT Manual Therapy Minutes  30  -TR         Exercise 1    Exercise Name 1  Hooklying BUE unilateral horizontal abduction   -TR      Cueing 1  Verbal  -TR      Sets 1  2  -TR      Reps 1  10  -TR      Additional Comments  some tension noted with RUE   -TR         Exercise 2    Exercise Name 2  RUE SA punches   -TR      Cueing 2  Verbal;Tactile  -TR      Sets 2  2  -TR      Reps 2  10  -TR         Exercise 3    Exercise Name 3  B UT stretches   -TR      Cueing 3  Verbal  -TR      Sets 3  3  -TR      Time 3  30 seconds   -TR        User Key  (r) = Recorded By, (t) = Taken By, (c) = Cosigned By    Initials Name Provider Type    TR Tracy Cullen PTA Physical Therapy Assistant                      Manual Rx (last 36 hours)      Manual Treatments     Row Name 03/06/20 0800             Total Minutes    36045 - PT Manual Therapy Minutes  30  -TR         Manual Rx 1    Manual Rx 1 Location  thoracic  -TR      Manual Rx 1 Type  IASTM w/ blue ridged roller, prone  -TR         Manual Rx 2    Manual Rx 2 Location  thoracic and CT  -TR      Manual Rx 2 Type  extension mobilizations, prone  -TR         Manual Rx 3    Manual Rx 3 Location  R UT and cervical paraspinals   -TR      Manual Rx 3 Type  STM in prone   -TR         Manual Rx 4    Manual Rx 4 Location  occipital protuberances  -TR      Manual Rx 4 Type  suboccipital release  -TR        User Key  (r) = Recorded By, (t) = Taken By, (c) = Cosigned By    Initials Name Provider Type    TR Tracy Cullen PTA Physical Therapy Assistant          PT OP Goals     Row Name 03/06/20 0800          PT Short Term Goals    STG Date to Achieve  03/04/20  -TR     STG 1  Patient will report decreased frequency of headaches with some headache free days.   -TR     STG 1 Progress  Ongoing  -TR     STG 1  Progress Comments  He reports that the intensity of his pain has decreased when he has HA's  -TR     STG 2  Patient will perform equal cervical rotation in both directions without pain.   -TR     STG 2 Progress  Ongoing;Progressing  -TR     STG 3  Patient will be able to SLS on each LE >10 sec.  -TR     STG 3 Progress  Ongoing;Progressing;Partially Met  -TR        Long Term Goals    LTG Date to Achieve  04/03/20  -TR     LTG 1  Patient will be independent with a comprehensive postural HEP.   -TR     LTG 1 Progress  Ongoing;Progressing  -TR     LTG 2  Patient will have no more than 1 headache per week.   -TR     LTG 2 Progress  Ongoing  -TR     LTG 3  All cervical ROM will be WFL and pain free.   -TR     LTG 3 Progress  Ongoing  -TR     LTG 4  Patient will be able to walk balance beam with no support or LOB.  -TR     LTG 4 Progress  Ongoing;Progressing  -TR        Time Calculation    PT Goal Re-Cert Due Date  04/03/20  -TR       User Key  (r) = Recorded By, (t) = Taken By, (c) = Cosigned By    Initials Name Provider Type    Tracy Murdock PTA Physical Therapy Assistant          Therapy Education  Given: HEP  Program: Reinforced  How Provided: Verbal  Provided to: Patient  Level of Understanding: Verbalized              Time Calculation:   Total Timed Code Minutes- PT: 40 minute(s)                Tracy Cullen PTA  3/6/2020

## 2020-03-09 ENCOUNTER — TREATMENT (OUTPATIENT)
Dept: PHYSICAL THERAPY | Facility: CLINIC | Age: 55
End: 2020-03-09

## 2020-03-09 DIAGNOSIS — F07.81 POST CONCUSSION SYNDROME: ICD-10-CM

## 2020-03-09 DIAGNOSIS — M54.2 CERVICAL PAIN: Primary | ICD-10-CM

## 2020-03-09 PROCEDURE — 97140 MANUAL THERAPY 1/> REGIONS: CPT | Performed by: PHYSICAL THERAPIST

## 2020-03-09 PROCEDURE — 97110 THERAPEUTIC EXERCISES: CPT | Performed by: PHYSICAL THERAPIST

## 2020-03-09 NOTE — PROGRESS NOTES
"Outpatient Physical Therapy Ortho Treatment Note       Patient Name: Urban Molina  : 1965  MRN: [id number]  Today's Date: 3/9/2020      Visit Date: 2020    Visit Dx:    ICD-10-CM ICD-9-CM   1. Cervical pain M54.2 723.1   2. Post concussion syndrome F07.81 310.2       There is no problem list on file for this patient.       Past Medical History:   Diagnosis Date   • Back pain    • Blurred vision    • Head injury due to trauma    • Inguinal hernia, right     sched repair   • Painful urination         Past Surgical History:   Procedure Laterality Date   • COLONOSCOPY     • HERNIA REPAIR Right     inguinal   • INGUINAL HERNIA REPAIR Right 2016    Procedure: INGUINAL HERNIA REPAIR;  Surgeon: Matti Kelley MD;  Location: [facility];  Service:    • NO PAST SURGERIES                         PT Assessment/Plan     Row Name 20 0800          PT Assessment    Assessment Comments  Today pt presented with increased HA; however it was decreased post session. He continues to have increased gaurding in R cervical parapsinals. He did well with strenghtenign so his HEP was progressed with genlte scapular strenghtening with yellow band.   -TR        PT Plan    PT Plan Comments  Continue to work on decreasing muscle gaurding.   -TR       User Key  (r) = Recorded By, (t) = Taken By, (c) = Cosigned By    Initials Name Provider Type    TR Tracy Cullen, PTA Physical Therapy Assistant            OP Exercises     Row Name 20 0800             Subjective Comments    Subjective Comments  Pt reports feeling good following last session; however the pain came back that night  -TR         Subjective Pain    Able to rate subjective pain?  yes  -TR      Pre-Treatment Pain Level  5  -TR         Total Minutes    53215 - PT Therapeutic Exercise Minutes  15  -TR      00392 - PT Manual Therapy Minutes  30  -TR         Exercise 1    Exercise Name 1  Prone \"I's\"  -TR      Cueing 1  Verbal  -TR      Sets 1  1  " "-TR      Reps 1  10  -TR         Exercise 2    Exercise Name 2  Prone \"T's\"   -TR      Cueing 2  Verbal  -TR      Sets 2  2  -TR      Reps 2  10  -TR         Exercise 3    Exercise Name 3  Hooklying BUE with yello wband   -TR      Cueing 3  Verbal  -TR      Sets 3  2  -TR      Reps 3  10  -TR      Additional Comments  1 set unilateral alternating, 2nd set bilateral   -TR         Exercise 4    Exercise Name 4  Standing scap squeezes with yellow band   -TR      Cueing 4  Verbal  -TR      Sets 4  2  -TR      Reps 4  10  -TR        User Key  (r) = Recorded By, (t) = Taken By, (c) = Cosigned By    Initials Name Provider Type    TR Tracy Cullen PTA Physical Therapy Assistant                      Manual Rx (last 36 hours)      Manual Treatments     Row Name 03/09/20 0800             Total Minutes    94705 - PT Manual Therapy Minutes  30  -TR         Manual Rx 1    Manual Rx 1 Location  thoracic  -TR      Manual Rx 1 Type  IASTM w/ blue ridged roller, prone  -TR         Manual Rx 2    Manual Rx 2 Location  thoracic and CT  -TR      Manual Rx 2 Type  extension mobilizations, prone  -TR         Manual Rx 3    Manual Rx 3 Location  R UT and cervical paraspinals   -TR      Manual Rx 3 Type  STM in prone   -TR         Manual Rx 4    Manual Rx 4 Location  cervical   -TR      Manual Rx 4 Type  STM with intermittent traction   -TR      Manual Rx 4 Grade  min  -TR         Manual Rx 5    Manual Rx 5 Location  Suboccpital release   -TR        User Key  (r) = Recorded By, (t) = Taken By, (c) = Cosigned By    Initials Name Provider Type    TR Tracy Cullen PTA Physical Therapy Assistant          PT OP Goals     Row Name 03/09/20 0800          PT Short Term Goals    STG Date to Achieve  03/04/20  -TR     STG 1  Patient will report decreased frequency of headaches with some headache free days.   -TR     STG 1 Progress  Ongoing  -TR     STG 1 Progress Comments  HA decreased post session   -TR     STG 2  Patient will " perform equal cervical rotation in both directions without pain.   -TR     STG 2 Progress  Ongoing;Progressing  -TR     STG 3  Patient will be able to SLS on each LE >10 sec.  -TR     STG 3 Progress  Ongoing;Progressing;Partially Met  -TR        Long Term Goals    LTG Date to Achieve  04/03/20  -TR     LTG 1  Patient will be independent with a comprehensive postural HEP.   -TR     LTG 1 Progress  Ongoing;Progressing  -TR     LTG 2  Patient will have no more than 1 headache per week.   -TR     LTG 2 Progress  Ongoing  -TR     LTG 3  All cervical ROM will be WFL and pain free.   -TR     LTG 3 Progress  Ongoing  -TR     LTG 4  Patient will be able to walk balance beam with no support or LOB.  -TR     LTG 4 Progress  Ongoing;Progressing  -TR        Time Calculation    PT Goal Re-Cert Due Date  04/03/20  -TR       User Key  (r) = Recorded By, (t) = Taken By, (c) = Cosigned By    Initials Name Provider Type    Tracy Murdock PTA Physical Therapy Assistant          Therapy Education  Education Details: Scap squeezes with yellow band and horizontal abductin   Given: HEP  Program: New  How Provided: Verbal, Demonstration, Written  Provided to: Patient  Level of Understanding: Verbalized, Demonstrated              Time Calculation:   Total Timed Code Minutes- PT: 45 minute(s)                Tracy Cullen PTA  3/9/2020

## 2020-03-11 ENCOUNTER — TREATMENT (OUTPATIENT)
Dept: PHYSICAL THERAPY | Facility: CLINIC | Age: 55
End: 2020-03-11

## 2020-03-11 DIAGNOSIS — M54.2 CERVICAL PAIN: Primary | ICD-10-CM

## 2020-03-11 DIAGNOSIS — F07.81 POST CONCUSSION SYNDROME: ICD-10-CM

## 2020-03-11 PROCEDURE — 97140 MANUAL THERAPY 1/> REGIONS: CPT | Performed by: PHYSICAL THERAPIST

## 2020-03-11 PROCEDURE — 97110 THERAPEUTIC EXERCISES: CPT | Performed by: PHYSICAL THERAPIST

## 2020-03-11 NOTE — PROGRESS NOTES
Outpatient Physical Therapy Ortho Treatment Note       Patient Name: Urban Molina  : 1965  MRN: [id number]  Today's Date: 3/11/2020      Visit Date: 2020    Visit Dx:    ICD-10-CM ICD-9-CM   1. Cervical pain M54.2 723.1   2. Post concussion syndrome F07.81 310.2       There is no problem list on file for this patient.       Past Medical History:   Diagnosis Date   • Back pain    • Blurred vision    • Head injury due to trauma    • Inguinal hernia, right     sched repair   • Painful urination         Past Surgical History:   Procedure Laterality Date   • COLONOSCOPY     • HERNIA REPAIR Right     inguinal   • INGUINAL HERNIA REPAIR Right 2016    Procedure: INGUINAL HERNIA REPAIR;  Surgeon: Matti Kelley MD;  Location: [facility];  Service:    • NO PAST SURGERIES                         PT Assessment/Plan     Row Name 20 0800          PT Assessment    Assessment Comments  Mr. Molina did present with increased HA today; however he reports overall improvement. He was more gaurded in R upper trap today but this was resolved post STM and his cervical ROM has grealty improved. He had 64 degrees to the R with only slight tightness and 68 degrees to the L. He has also met 2 of his goals regaurding balance and did demonstrate good balance with activties today as long as he performed them slowly. We will work to challange his balance with quicker movements, head turns, and distractions to simuilate his work enviroment.   -TR        PT Plan    PT Plan Comments  Continue to challange his dynamic balance and continue with STM adn cervical mobility to decrease HA's.   -TR       User Key  (r) = Recorded By, (t) = Taken By, (c) = Cosigned By    Initials Name Provider Type    Tracy Murdock PTA Physical Therapy Assistant            OP Exercises     Row Name 20 0824 20 0800          Subjective Comments    Subjective Comments  Pt reports being up through the night with a HA and feel  tight today due to not being able to sleep 8:00  -TR  --        Subjective Pain    Able to rate subjective pain?  yes  -TR  --     Pre-Treatment Pain Level  5  -TR  --     Post-Treatment Pain Level  2  -TR  --        Total Minutes    74807 -  PT Neuromuscular Reeducation Minutes  15  -TR  --     65032 - PT Manual Therapy Minutes  --  25  -TR        Exercise 1    Exercise Name 1  Wobble board A/P, M/L with EO and EC   -TR  --     Cueing 1  Verbal  -TR  --     Additional Comments  He struggled with M/L EC   -TR  --        Exercise 2    Exercise Name 2  Tandem stance   -TR  --     Cueing 2  Verbal  -TR  --     Time 2  30 seconds both directinos   -TR  --        Exercise 3    Exercise Name 3  SLS BLE   -TR  --     Time 3  30 seconds bilaterally   -TR  --     Additional Comments  steady on RLE, some sway on Left LE after 15 seconds   -TR  --        Exercise 4    Exercise Name 4  Balance beam  -TR  --     Cueing 4  Verbal  -TR  --     Sets 4  2  -TR  --        Exercise 5    Exercise Name 5  Braiding and tandem walking   -TR  --     Cueing 5  Verbal;Demo  -TR  --     Sets 5  2  -TR  --     Time 5  20'  -TR  --       User Key  (r) = Recorded By, (t) = Taken By, (c) = Cosigned By    Initials Name Provider Type    TR Tracy Cullen, PTA Physical Therapy Assistant                      Manual Rx (last 36 hours)      Manual Treatments     Row Name 03/11/20 0800             Total Minutes    49984 - PT Manual Therapy Minutes  25  -TR         Manual Rx 1    Manual Rx 1 Location  R UT, LS   -TR      Manual Rx 1 Type  STM with massage cream in prone   -TR      Manual Rx 1 Grade  mod  -TR         Manual Rx 2    Manual Rx 2 Location  Cervical   -TR      Manual Rx 2 Type  STm with intermittent tractino and L sidebends   -TR      Manual Rx 2 Grade  2  -TR         Manual Rx 3    Manual Rx 3 Location  Suboccipital release   -TR      Manual Rx 3 Grade  2  -TR        User Key  (r) = Recorded By, (t) = Taken By, (c) = Cosigned By     Initials Name Provider Type    Tracy Murdock PTA Physical Therapy Assistant          PT OP Goals     Row Name 03/11/20 0800          PT Short Term Goals    STG Date to Achieve  03/04/20  -TR     STG 1  Patient will report decreased frequency of headaches with some headache free days.   -TR     STG 1 Progress  Ongoing  -TR     STG 1 Progress Comments  increased HA last night   -TR     STG 2  Patient will perform equal cervical rotation in both directions without pain.   -TR     STG 2 Progress  Ongoing;Progressing  -TR     STG 2 Progress Comments  R 64 degrees with tightness at end range   -TR     STG 3  Patient will be able to SLS on each LE >10 sec.  -TR     STG 3 Progress  Met  -TR     STG 3 Progress Comments  30 seconds bilaterally, slight increase in sway on LLE after 15 seconds   -TR        Long Term Goals    LTG Date to Achieve  04/03/20  -TR     LTG 1  Patient will be independent with a comprehensive postural HEP.   -TR     LTG 1 Progress  Ongoing;Progressing  -TR     LTG 2  Patient will have no more than 1 headache per week.   -TR     LTG 2 Progress  Ongoing  -TR     LTG 3  All cervical ROM will be WFL and pain free.   -TR     LTG 3 Progress  Ongoing  -TR     LTG 3 Progress Comments  R 64 degrees with tightness at end range, L 68 degrees   -TR     LTG 4  Patient will be able to walk balance beam with no support or LOB.  -TR     LTG 4 Progress  Met  -TR        Time Calculation    PT Goal Re-Cert Due Date  04/03/20  -TR       User Key  (r) = Recorded By, (t) = Taken By, (c) = Cosigned By    Initials Name Provider Type    Tracy Murdock PTA Physical Therapy Assistant          Therapy Education  Education Details: POC, balanace at home, fall safety   Given: HEP, Fall prevention and home safety  Program: Reinforced  How Provided: Verbal  Provided to: Patient  Level of Understanding: Verbalized              Time Calculation:   Total Timed Code Minutes- PT: 45 minute(s)                Tracy  Mandi Cullen, PTA  3/11/2020

## 2020-03-18 ENCOUNTER — TREATMENT (OUTPATIENT)
Dept: PHYSICAL THERAPY | Facility: CLINIC | Age: 55
End: 2020-03-18

## 2020-03-18 DIAGNOSIS — M54.2 CERVICAL PAIN: Primary | ICD-10-CM

## 2020-03-18 DIAGNOSIS — F07.81 POST CONCUSSION SYNDROME: ICD-10-CM

## 2020-03-18 PROCEDURE — 97110 THERAPEUTIC EXERCISES: CPT | Performed by: PHYSICAL THERAPIST

## 2020-03-18 PROCEDURE — 97140 MANUAL THERAPY 1/> REGIONS: CPT | Performed by: PHYSICAL THERAPIST

## 2020-03-18 NOTE — PROGRESS NOTES
Outpatient Physical Therapy Ortho Treatment Note       Patient Name: Urban Molina  : 1965  MRN: [id number]  Today's Date: 3/18/2020      Visit Date: 2020    Visit Dx:    ICD-10-CM ICD-9-CM   1. Cervical pain M54.2 723.1   2. Post concussion syndrome F07.81 310.2       There is no problem list on file for this patient.       Past Medical History:   Diagnosis Date   • Back pain    • Blurred vision    • Head injury due to trauma    • Inguinal hernia, right     sched repair   • Painful urination         Past Surgical History:   Procedure Laterality Date   • COLONOSCOPY     • HERNIA REPAIR Right     inguinal   • INGUINAL HERNIA REPAIR Right 2016    Procedure: INGUINAL HERNIA REPAIR;  Surgeon: Matti Kelley MD;  Location: [facility];  Service:    • NO PAST SURGERIES                         PT Assessment/Plan     Row Name 20          PT Assessment    Assessment Comments  We continued to work on increasing cervical mobility and improving balance. He did struggle w/ the tandem activity today requiring UE support part time. He did very well w/ dynamic activites. When he bent down to  cones, he always lunges forward onto his R LE because if he goes toward his L, he will stagger. His ankle ROM was compared bilaterally and appear to by symmetrical.   -        PT Plan    PT Plan Comments  Continue to work on balancing and ability to bend down equally on both sides.   -       User Key  (r) = Recorded By, (t) = Taken By, (c) = Cosigned By    Initials Name Provider Type    Mandy Deal PTA Physical Therapy Assistant            OP Exercises     Row Name 20 4786             Subjective Comments    Subjective Comments  Pt. was up and down all night last night due to his symptoms. He has a headache and his neck hurt. If he moves towards his L, if feels like he wants to just keep going.   -         Subjective Pain    Able to rate subjective pain?  yes  -      Pre-Treatment  Pain Level  5  -AH      Post-Treatment Pain Level  -- 3.5  -         Total Minutes    98768 -  PT Neuromuscular Reeducation Minutes  21  -AH      48432 - PT Manual Therapy Minutes  24  -AH         Exercise 1    Exercise Name 1  tandem stance t/f cones from one table to the other on each side  -      Cueing 1  Verbal;Demo  -      Sets 1  2  -      Reps 1  5 cones x2  -      Additional Comments  needed UE support part time  -         Exercise 2    Exercise Name 2  side stepping  -      Cueing 2  Verbal;Demo  -         Exercise 3    Exercise Name 3  braiding  -      Cueing 3  Verbal;Demo  -      Additional Comments  front, back, combo  -         Exercise 4    Exercise Name 4  bending down to  cones  -      Cueing 4  Verbal  -      Sets 4  2  -      Reps 4  5  -AH        User Key  (r) = Recorded By, (t) = Taken By, (c) = Cosigned By    Initials Name Provider Type     Mandy Bishop PTA Physical Therapy Assistant                      Manual Rx (last 36 hours)      Manual Treatments     Row Name 03/18/20 0921             Total Minutes    64060 - PT Manual Therapy Minutes  24  -AH         Manual Rx 1    Manual Rx 1 Location  B UT, LS, & suboccipitals  -      Manual Rx 1 Type  STM w/ massage cream, prone  -AH         Manual Rx 2    Manual Rx 2 Location  cervical  -AH      Manual Rx 2 Type  manual distraction w/ bilateral sidebends, sustained  -AH         Manual Rx 3    Manual Rx 3 Location  suboccipital release  -        User Key  (r) = Recorded By, (t) = Taken By, (c) = Cosigned By    Initials Name Provider Type     Mandy Bishop, MERVIN Physical Therapy Assistant          PT OP Goals     Row Name 03/18/20 0921          PT Short Term Goals    STG Date to Achieve  03/04/20  -     STG 1  Patient will report decreased frequency of headaches with some headache free days.   -     STG 1 Progress  Ongoing  -     STG 1 Progress Comments  Pt. has HAs everyday intermittently, this  significantly affects his sleep.   -     STG 2  Patient will perform equal cervical rotation in both directions without pain.   -     STG 2 Progress  Ongoing;Progressing  -     STG 3  Patient will be able to SLS on each LE >10 sec.  -     STG 3 Progress  Met  [facility]        Long Term Goals    LTG Date to Achieve  04/03/20  -     LTG 1  Patient will be independent with a comprehensive postural HEP.   -     LTG 1 Progress  Ongoing;Progressing  -     LTG 2  Patient will have no more than 1 headache per week.   -     LTG 2 Progress  Ongoing  -     LTG 3  All cervical ROM will be WFL and pain free.   -     LTG 3 Progress  Ongoing  -     LTG 4  Patient will be able to walk balance beam with no support or LOB.  -     LTG 4 Progress  Met  [facility]        Time Calculation    PT Goal Re-Cert Due Date  04/03/20  -       User Key  (r) = Recorded By, (t) = Taken By, (c) = Cosigned By    Initials Name Provider Type    Mandy Deal PTA Physical Therapy Assistant          Therapy Education  Given: Posture/body mechanics, Symptoms/condition management  Program: Reinforced  How Provided: Verbal, Demonstration  Provided to: Patient  Level of Understanding: Demonstrated              Time Calculation:   Total Timed Code Minutes- PT: 45 minute(s)                Mandy Bishop PTA  3/18/2020

## 2020-03-27 ENCOUNTER — TELEPHONE (OUTPATIENT)
Dept: PHYSICAL THERAPY | Facility: CLINIC | Age: 55
End: 2020-03-27

## 2020-03-27 DIAGNOSIS — F07.81 POST CONCUSSION SYNDROME: ICD-10-CM

## 2020-03-27 DIAGNOSIS — M54.2 CERVICAL PAIN: Primary | ICD-10-CM

## 2020-03-27 NOTE — TELEPHONE ENCOUNTER
I called to follow up with patient, and he reports everything is going well with HEP.  He declined the utilization of telehealth at this time.

## 2020-04-03 ENCOUNTER — TELEPHONE (OUTPATIENT)
Dept: PHYSICAL THERAPY | Facility: CLINIC | Age: 55
End: 2020-04-03

## 2020-04-03 DIAGNOSIS — F07.81 POST CONCUSSION SYNDROME: ICD-10-CM

## 2020-04-03 DIAGNOSIS — M54.2 CERVICAL PAIN: Primary | ICD-10-CM

## 2020-04-03 NOTE — TELEPHONE ENCOUNTER
Spoke with him to see how he was doing. He says that he is about the same overall. He still gets headaches and his neck stiffens up at different times of the day. His balance is no worse. He doesn't understand technology enough to do Telehealth and right now, his insurance doesn't cover it. I told him to call us if he had any problems or concerns and we would try to call him to check on him as well until we reached a point he could come into the clinic.

## 2020-04-16 ENCOUNTER — TELEPHONE (OUTPATIENT)
Dept: INTERNAL MEDICINE | Facility: CLINIC | Age: 55
End: 2020-04-16

## 2020-04-16 NOTE — TELEPHONE ENCOUNTER
Patient contacted office, stated that he needed a letter for his , Darryl Díaz, in regards to work comp.

## 2020-05-19 ENCOUNTER — OFFICE VISIT (OUTPATIENT)
Dept: INTERNAL MEDICINE | Facility: CLINIC | Age: 55
End: 2020-05-19

## 2020-05-19 VITALS
HEART RATE: 62 BPM | TEMPERATURE: 97.1 F | WEIGHT: 175.5 LBS | RESPIRATION RATE: 18 BRPM | BODY MASS INDEX: 22.52 KG/M2 | OXYGEN SATURATION: 99 % | SYSTOLIC BLOOD PRESSURE: 118 MMHG | DIASTOLIC BLOOD PRESSURE: 70 MMHG | HEIGHT: 74 IN

## 2020-05-19 DIAGNOSIS — S09.90XS TRAUMATIC INJURY OF HEAD, SEQUELA: ICD-10-CM

## 2020-05-19 DIAGNOSIS — F07.81 POST CONCUSSION SYNDROME: Primary | ICD-10-CM

## 2020-05-19 DIAGNOSIS — R41.840 CONCENTRATION DEFICIT: ICD-10-CM

## 2020-05-19 PROBLEM — S09.90XA HEAD INJURY DUE TO TRAUMA: Status: ACTIVE | Noted: 2020-05-19

## 2020-05-19 PROCEDURE — 99214 OFFICE O/P EST MOD 30 MIN: CPT | Performed by: FAMILY MEDICINE

## 2020-05-19 RX ORDER — VENLAFAXINE HYDROCHLORIDE 75 MG/1
75 CAPSULE, EXTENDED RELEASE ORAL DAILY
Qty: 90 CAPSULE | Refills: 0 | Status: SHIPPED | OUTPATIENT
Start: 2020-05-19

## 2020-05-19 NOTE — PROGRESS NOTES
"CC:   Chief Complaint   Patient presents with   • Follow-up     patient states his is doing a little better       History:  Urban Molina is a 55 y.o. male who presents today for follow-up for evaluation of the above:    States that patient is overall doing a little better, he can tell that the Effexor does improve his mood.  He still feels like he is having dizziness, usually from sitting to standing or after bending over. Pain better    ROS:  Review of Systems   Constitutional: Activity change: able to do a little more.   Neurological: Positive for dizziness.   Psychiatric/Behavioral: Positive for behavioral problems.       Mr. Molina  reports that he has been smoking cigarettes. He has a 10.00 pack-year smoking history. His smokeless tobacco use includes snuff. He reports that he drinks alcohol. He reports that he has current or past drug history. Drugs: Amphetamines, Marijuana, Cocaine, and Other.      Current Outpatient Medications:   •  Acetaminophen (TYLENOL) 325 MG capsule, Take 1 tablet by mouth As Needed., Disp: , Rfl:   •  baclofen (LIORESAL) 10 MG tablet, Take 10 mg by mouth 3 (Three) Times a Day As Needed for Muscle Spasms., Disp: , Rfl:   •  ibuprofen (ADVIL,MOTRIN) 200 MG tablet, Take 200 mg by mouth Every 6 (Six) Hours As Needed for Mild Pain ., Disp: , Rfl:   •  venlafaxine XR (EFFEXOR-XR) 75 MG 24 hr capsule, Take 1 capsule by mouth Daily., Disp: 90 capsule, Rfl: 0      OBJECTIVE:  /70 (BP Location: Left arm, Patient Position: Sitting, Cuff Size: Adult)   Pulse 62   Temp 97.1 °F (36.2 °C) (Tympanic)   Resp 18   Ht 188 cm (74\")   Wt 79.6 kg (175 lb 8 oz)   SpO2 99%   BMI 22.53 kg/m²    Physical Exam   Constitutional: He is oriented to person, place, and time. No distress.   HENT:   Head: Normocephalic and atraumatic.   Nose: Nose normal.   Eyes: Conjunctivae are normal. Right eye exhibits no discharge. Left eye exhibits no discharge. No scleral icterus.   Neck: No tracheal deviation " present.   Pulmonary/Chest: Effort normal.   Neurological: He is alert and oriented to person, place, and time.   Skin: Skin is warm and dry. He is not diaphoretic. No pallor.   Psychiatric: He has a normal mood and affect. His behavior is normal. Judgment and thought content normal.   Nursing note and vitals reviewed.      Assessment/Plan     Diagnosis Plan   1. Post concussion syndrome  Ambulatory Referral to Neurology    venlafaxine XR (EFFEXOR-XR) 75 MG 24 hr capsule   2. Concentration deficit  Ambulatory Referral to Neurology    venlafaxine XR (EFFEXOR-XR) 75 MG 24 hr capsule   3. 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  Given persistent dizziness will send to neurology for further assessment and recommendation.         Urban Coello D.O.  [facility]  [facility]

## 2020-05-26 ENCOUNTER — OFFICE VISIT (OUTPATIENT)
Dept: NEUROLOGY | Facility: CLINIC | Age: 55
End: 2020-05-26

## 2020-05-26 VITALS
WEIGHT: 175 LBS | SYSTOLIC BLOOD PRESSURE: 100 MMHG | HEART RATE: 52 BPM | BODY MASS INDEX: 22.46 KG/M2 | HEIGHT: 74 IN | DIASTOLIC BLOOD PRESSURE: 52 MMHG | OXYGEN SATURATION: 98 %
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
## 2020-05-26 DIAGNOSIS — M54.2 NECK PAIN: ICD-10-CM

## 2020-05-26 DIAGNOSIS — Z74.09 IMPAIRED FUNCTIONAL MOBILITY, BALANCE, AND ENDURANCE: ICD-10-CM

## 2020-05-26 DIAGNOSIS — F07.81 POST CONCUSSIVE SYNDROME: Primary | ICD-10-CM

## 2020-05-26 PROCEDURE — 99203 OFFICE O/P NEW LOW 30 MIN: CPT | Performed by: PHYSICIAN ASSISTANT

## 2020-05-26 RX ORDER — MECLIZINE HYDROCHLORIDE 25 MG/1
25 TABLET ORAL 4 TIMES DAILY PRN
Qty: 60 TABLET | Refills: 0 | Status: SHIPPED | OUTPATIENT
Start: 2020-05-26

## 2020-05-26 NOTE — PROGRESS NOTES
"    Neurology Consult Note    Referring Provider: Urban Coello DO    Reason for Consultation:    Work-related injury  Concussion, January 2020  Post-concussion impaired balance    Subjective     History of Present Illness:      Employer: [employer]  Job title:   Date of injury: 1/8/2020  Mechanism of injury: This is a 55-year-old left-hand-dominant male who was working as a  for [employer], when he was assisting hanging some scaffolding.  On 1/8/2020, the patient was working below another employee while he was wearing a hard hat, however, the patient working about him lost hold of his hammer and struck him in the right occiput on his hard hat.  There was no loss of consciousness.  However, immediately, thereafter, the patient began developing some nausea.  The patient later that night presented to the emergency room for further evaluation where he was told that he had a concussion.    7 to 8 days later, however, the patient continued to have right occipital headache and noted that he had some blurred vision as well as impaired balance.  CT of the head and cervical spine were negative for acute intracranial or bony abnormality, however, there was mild degenerative changes in the cervical spine.    The patient also noted that he has had some pain in the right lateral neck and upper and mid shoulder.  Patient has undergone outpatient physical therapy for 10-12 visits as well as with attention to his impaired balance.  Overall, it has improved, however, he has concerns because he states that his balance is still \"off.\"  The patient tends to work at elevated heights and is concerned about his ability to work because of the impaired balance.  He has had no falls at home.  He still has some intermittent blurred vision.  The patient states his headaches have improved, however, still, occasionally, he will get a brief, lancinating pain through the right occiput into the right retro-orbital space.  However, " his balance being impaired is his greatest concern at this time.    He also was recently started on Effexor XR for the agitation.  He has not yet seen beneficial effects of this medication.  Additionally, he has no longer getting outpatient therapy.    It should be noted that the patient likely has had several unrecognized concussions in the past making his threshold for subsequent concussion much lower as well as the symptoms likely to be much more exaggerated.  In fact, the patient used to be a competitive boxer.      Past Medical History:   Diagnosis Date   • Back pain    • Blurred vision    • Head injury due to trauma    • Inguinal hernia, right     sched repair   • Painful urination        No Known Allergies  Current Outpatient Medications on File Prior to Visit   Medication Sig   • Acetaminophen (TYLENOL) 325 MG capsule Take 1 tablet by mouth As Needed.   • ibuprofen (ADVIL,MOTRIN) 200 MG tablet Take 200 mg by mouth Every 6 (Six) Hours As Needed for Mild Pain .   • venlafaxine XR (EFFEXOR-XR) 75 MG 24 hr capsule Take 1 capsule by mouth Daily.   • [DISCONTINUED] baclofen (LIORESAL) 10 MG tablet Take 10 mg by mouth 3 (Three) Times a Day As Needed for Muscle Spasms.     No current facility-administered medications on file prior to visit.        Social History     Socioeconomic History   • Marital status:      Spouse name: Not on file   • Number of children: Not on file   • Years of education: Not on file   • Highest education level: Not on file   Tobacco Use   • Smoking status: Current Every Day Smoker     Packs/day: 0.25     Years: 40.00     Pack years: 10.00     Types: Cigarettes   • Smokeless tobacco: Current User     Types: Snuff   Substance and Sexual Activity   • Alcohol use: Yes     Comment: Occ   • Drug use: Not Currently     Types: Amphetamines, Marijuana, Cocaine, Other   • Sexual activity: Yes     Partners: Female     Family History   Problem Relation Age of Onset   • Cancer Mother    • Stroke  Paternal Grandfather        Review of Systems  A 14 point review of systems was reviewed and was negative.    Objective      Vital Signs  Heart Rate:  [52] 52  BP: (100)/(52) 100/52    General Exam:  Head:  Normocephalic, atraumatic.  HEENT: PERRLA.  Full EOM.  Neck:  No lymphadenopathy, thyromegaly or bruit.  Cardiac:  Regular rate and rhythm.  Normal S1, S2.  No murmur, rub or gallop.  Lungs:  Clear to auscultation bilaterally.  No wheeze, rales or rhonchi.  Abdomen:  Non-tender, Non-distended.  Bowel sounds normoactive.  Extremities: Full peripheral pulses.  No clubbing, cyanosis or edema.  Skin: No ulceration, breakdown or rash.      Neurologic Exam:  Mental Status:    -Awake. Alert. Oriented to person, place & time.  -No word finding difficulties.  -No aphasia.  -No dysarthria.  -Follows simple commands.     CN II:  Full visual fields with confrontation.  Pupils equally reactive to light.  CN III, IV, VI:  Extraocular muscles function intact with no nystagmus.  CN V:  Facial sensory is symmetric.  CN VII:  Facial motor symmetric.  CN VIII:  Gross hearing intact bilaterally.  CN IX/X:  Palate elevates symmetrically.  CN XI:  Shoulder shrug symmetric.  CN XII:  Tongue is midline on protrusion.     Motor: (strength out of 5:  1= minimal movement, 2 = movement in plane of gravity, 3 = movement against gravity, 4 = movement against some resistance, 5 = full strength)     -5/5 in bilateral biceps, triceps, brachioradialis, wrist extensors and intrinsic muscles of the hand.    -5/5 in bilateral hip flexors, quadriceps, hamstrings, gastrocsoleus complex, anterior tibialis and extensor hallucis longus.       Deep Tendon Reflexes:  -Right              Biceps: 2+         Triceps: 2+      Brachioradialis: 2+              Patella: 2+       Ankle: 2+         Babinski:  negative  -Left              Biceps: 2+         Triceps: 2+      Brachioradialis: 2+              Patella: 2+       Ankle: 2+         Babinski:   negative    Tone (Modified Ewa Scale):  No appreciable increase in tone or rigidity noted.     Sensory:  -Intact to light touch, pinprick BUE (C5-T1) and BLE (L2-S1).     Coordination:  -Finger to nose intact BUEs  -Heel to shin intact BLEs  -No ataxia     Gait  -No signs of ataxia  -ambulates unassisted    Results Review:  No components found for: A1C  Lab Results   Component Value Date    HDL 62 10/19/2019    LDL 82 10/19/2019     No components found for: B12  No results found for: TSH    Assessment/Plan     Impression:  1.  Work-related injury, 1/8/2020  2.  Right lateral neck and shoulder pain  3.  Postconcussive headache  4.  Impaired balance as a result of concussion  5.  Agitation is a late effect of concussion      Plan:  1.  The patient has impaired double leg, tandem and single leg stance.  He has numerous errors on the latter 2.  This is consistent with a post-concussive syndrome and typical to be delayed in recovery following a head injury.  In fact, this may remain permanently impaired as result of several previous concussions.  All in all, however, this has improved following his injury and with outpatient therapies.    2.  Although not likely to fix symptoms, per se, I am providing a short prescription of Antivert 25 mg that he can take up to 4 times daily as needed, however, I instructed that he take it twice daily for at least 1 week to see if taking it consecutively results in decreased acuity of the symptoms.  If it does help, he can take it on an as-needed basis, thereafter.    3.  He has no nystagmus on exam.  He has no lateralizing symptoms.  There is no indication for advanced imaging of the brain with MRI.  There is no indication for outpatient vestibular therapy or referral to ENT for evaluation.    4.  At this time, I believe that he can return to work for gainful employment, however, would be very cautious working at elevated heights.  He does wear a harness at times, I stated that he  should wear it at all times when appropriate and should not work alone, but rather with supervision and oversight from coworkers nearby should he have difficulties when working at elevated heights.    5.  From a neurology standpoint, he may follow-up with me on an as have needed basis.  I do encourage continued range of motion and treatment of his neck and right shoulder pain as this also can affect head and neck posture and, thus, his balance.    6.  Continue Effexor XR for his agitation, however, he continues to have agitation following mild TBI, consideration of Depakote twice daily may be helpful and may also lessen any headache that is residual at this point time.    I am certainly happy to reevaluate at any point in the future should this be required/needed.    Thank you, Dr. Coello, for the referral and the opportunity to participate in the care of your patient.  Is call with any concerns or questions in the interim.          Stephane Ibanez PA-C  05/26/20  10:54

## 2020-05-28 NOTE — PROGRESS NOTES
Patient notified, informed him that his letter has been faxed to Cecil Díaz, [phone].  Patient voiced understanding.

## 2022-03-29 ENCOUNTER — HOSPITAL ENCOUNTER (EMERGENCY)
Dept: NON INVASIVE DIAGNOSTICS | Facility: HOSPITAL | Age: 57
Discharge: HOME | End: 2022-03-29
Payer: COMMERCIAL

## 2022-03-29 ENCOUNTER — HOSPITAL ENCOUNTER (OUTPATIENT)
Facility: HOSPITAL | Age: 57
Setting detail: OBSERVATION
Discharge: HOME | End: 2022-03-30
Attending: EMERGENCY MEDICINE | Admitting: INTERNAL MEDICINE
Payer: COMMERCIAL

## 2022-03-29 ENCOUNTER — HOSPITAL ENCOUNTER (OUTPATIENT)
Dept: MRI IMAGING | Facility: HOSPITAL | Age: 57
Discharge: HOME | End: 2022-03-29
Payer: COMMERCIAL

## 2022-03-29 ENCOUNTER — APPOINTMENT (EMERGENCY)
Dept: CT IMAGING | Facility: HOSPITAL | Age: 57
End: 2022-03-29
Payer: COMMERCIAL

## 2022-03-29 DIAGNOSIS — G45.9 INTERMITTENT CEREBRAL ISCHEMIA: ICD-10-CM

## 2022-03-29 DIAGNOSIS — R29.898 HAND DYSFUNCTION: ICD-10-CM

## 2022-03-29 DIAGNOSIS — E78.2 MIXED HYPERLIPIDEMIA: ICD-10-CM

## 2022-03-29 DIAGNOSIS — I63.9 STROKE (HCC): Primary | ICD-10-CM

## 2022-03-29 PROBLEM — Z86.718 HISTORY OF DVT (DEEP VEIN THROMBOSIS): Status: ACTIVE | Noted: 2022-03-29

## 2022-03-29 PROBLEM — E78.5 HYPERLIPIDEMIA: Status: ACTIVE | Noted: 2022-03-29

## 2022-03-29 PROBLEM — I10 HYPERTENSION: Status: ACTIVE | Noted: 2022-03-29

## 2022-03-29 PROBLEM — R73.03 PREDIABETES: Status: ACTIVE | Noted: 2022-03-29

## 2022-03-29 LAB
ALBUMIN SERPL BCP-MCNC: 3.7 G/DL (ref 3.5–5)
ALP SERPL-CCNC: 90 U/L (ref 46–116)
ALT SERPL W P-5'-P-CCNC: 23 U/L (ref 12–78)
ANION GAP SERPL CALCULATED.3IONS-SCNC: 6 MMOL/L (ref 4–13)
APTT PPP: 25 SECONDS (ref 23–37)
AST SERPL W P-5'-P-CCNC: 15 U/L (ref 5–45)
ATRIAL RATE: 61 BPM
BASOPHILS # BLD AUTO: 0.07 THOUSANDS/ΜL (ref 0–0.1)
BASOPHILS NFR BLD AUTO: 1 % (ref 0–1)
BILIRUB SERPL-MCNC: 0.4 MG/DL (ref 0.2–1)
BUN SERPL-MCNC: 11 MG/DL (ref 5–25)
CALCIUM SERPL-MCNC: 8.6 MG/DL (ref 8.3–10.1)
CHLORIDE SERPL-SCNC: 105 MMOL/L (ref 100–108)
CHOLEST SERPL-MCNC: 213 MG/DL
CO2 SERPL-SCNC: 30 MMOL/L (ref 21–32)
CREAT SERPL-MCNC: 0.98 MG/DL (ref 0.6–1.3)
EOSINOPHIL # BLD AUTO: 0.12 THOUSAND/ΜL (ref 0–0.61)
EOSINOPHIL NFR BLD AUTO: 2 % (ref 0–6)
ERYTHROCYTE [DISTWIDTH] IN BLOOD BY AUTOMATED COUNT: 12.7 % (ref 11.6–15.1)
EST. AVERAGE GLUCOSE BLD GHB EST-MCNC: 140 MG/DL
GFR SERPL CREATININE-BSD FRML MDRD: 84 ML/MIN/1.73SQ M
GLUCOSE SERPL-MCNC: 140 MG/DL (ref 65–140)
HBA1C MFR BLD: 6.5 %
HCT VFR BLD AUTO: 44.6 % (ref 36.5–49.3)
HDLC SERPL-MCNC: 51 MG/DL
HGB BLD-MCNC: 15.3 G/DL (ref 12–17)
IMM GRANULOCYTES # BLD AUTO: 0.02 THOUSAND/UL (ref 0–0.2)
IMM GRANULOCYTES NFR BLD AUTO: 0 % (ref 0–2)
INR PPP: 1.02 (ref 0.84–1.19)
LDLC SERPL CALC-MCNC: 136 MG/DL (ref 0–100)
LYMPHOCYTES # BLD AUTO: 2.1 THOUSANDS/ΜL (ref 0.6–4.47)
LYMPHOCYTES NFR BLD AUTO: 31 % (ref 14–44)
MCH RBC QN AUTO: 31.5 PG (ref 26.8–34.3)
MCHC RBC AUTO-ENTMCNC: 34.3 G/DL (ref 31.4–37.4)
MCV RBC AUTO: 92 FL (ref 82–98)
MONOCYTES # BLD AUTO: 0.6 THOUSAND/ΜL (ref 0.17–1.22)
MONOCYTES NFR BLD AUTO: 9 % (ref 4–12)
NEUTROPHILS # BLD AUTO: 3.89 THOUSANDS/ΜL (ref 1.85–7.62)
NEUTS SEG NFR BLD AUTO: 57 % (ref 43–75)
NONHDLC SERPL-MCNC: 162 MG/DL
NRBC BLD AUTO-RTO: 0 /100 WBCS
P AXIS: 59 DEGREES
PLATELET # BLD AUTO: 231 THOUSANDS/UL (ref 149–390)
PMV BLD AUTO: 10.6 FL (ref 8.9–12.7)
POTASSIUM SERPL-SCNC: 4 MMOL/L (ref 3.5–5.3)
PR INTERVAL: 142 MS
PROT SERPL-MCNC: 7.4 G/DL (ref 6.4–8.2)
PROTHROMBIN TIME: 13.2 SECONDS (ref 11.6–14.5)
QRS AXIS: 80 DEGREES
QRSD INTERVAL: 86 MS
QT INTERVAL: 390 MS
QTC INTERVAL: 392 MS
RBC # BLD AUTO: 4.86 MILLION/UL (ref 3.88–5.62)
SODIUM SERPL-SCNC: 141 MMOL/L (ref 136–145)
T WAVE AXIS: 61 DEGREES
TRIGL SERPL-MCNC: 129 MG/DL
TSH SERPL DL<=0.05 MIU/L-ACNC: 0.92 UIU/ML (ref 0.36–3.74)
VENTRICULAR RATE: 61 BPM
WBC # BLD AUTO: 6.8 THOUSAND/UL (ref 4.31–10.16)

## 2022-03-29 PROCEDURE — 85303 CLOT INHIBIT PROT C ACTIVITY: CPT | Performed by: PHYSICIAN ASSISTANT

## 2022-03-29 PROCEDURE — 85670 THROMBIN TIME PLASMA: CPT | Performed by: PHYSICIAN ASSISTANT

## 2022-03-29 PROCEDURE — 85610 PROTHROMBIN TIME: CPT | Performed by: EMERGENCY MEDICINE

## 2022-03-29 PROCEDURE — 84443 ASSAY THYROID STIM HORMONE: CPT | Performed by: PHYSICIAN ASSISTANT

## 2022-03-29 PROCEDURE — 99205 OFFICE O/P NEW HI 60 MIN: CPT | Performed by: INTERNAL MEDICINE

## 2022-03-29 PROCEDURE — 85730 THROMBOPLASTIN TIME PARTIAL: CPT | Performed by: EMERGENCY MEDICINE

## 2022-03-29 PROCEDURE — 93970 EXTREMITY STUDY: CPT | Performed by: SURGERY

## 2022-03-29 PROCEDURE — 83036 HEMOGLOBIN GLYCOSYLATED A1C: CPT | Performed by: EMERGENCY MEDICINE

## 2022-03-29 PROCEDURE — 81241 F5 GENE: CPT | Performed by: PHYSICIAN ASSISTANT

## 2022-03-29 PROCEDURE — 85300 ANTITHROMBIN III ACTIVITY: CPT | Performed by: PHYSICIAN ASSISTANT

## 2022-03-29 PROCEDURE — 70498 CT ANGIOGRAPHY NECK: CPT

## 2022-03-29 PROCEDURE — 70551 MRI BRAIN STEM W/O DYE: CPT

## 2022-03-29 PROCEDURE — 86146 BETA-2 GLYCOPROTEIN ANTIBODY: CPT | Performed by: PHYSICIAN ASSISTANT

## 2022-03-29 PROCEDURE — 99285 EMERGENCY DEPT VISIT HI MDM: CPT

## 2022-03-29 PROCEDURE — 81240 F2 GENE: CPT | Performed by: PHYSICIAN ASSISTANT

## 2022-03-29 PROCEDURE — 80061 LIPID PANEL: CPT | Performed by: EMERGENCY MEDICINE

## 2022-03-29 PROCEDURE — 85613 RUSSELL VIPER VENOM DILUTED: CPT | Performed by: PHYSICIAN ASSISTANT

## 2022-03-29 PROCEDURE — 93010 ELECTROCARDIOGRAM REPORT: CPT | Performed by: INTERNAL MEDICINE

## 2022-03-29 PROCEDURE — 93970 EXTREMITY STUDY: CPT

## 2022-03-29 PROCEDURE — 85306 CLOT INHIBIT PROT S FREE: CPT | Performed by: PHYSICIAN ASSISTANT

## 2022-03-29 PROCEDURE — 93005 ELECTROCARDIOGRAM TRACING: CPT

## 2022-03-29 PROCEDURE — 70496 CT ANGIOGRAPHY HEAD: CPT

## 2022-03-29 PROCEDURE — 85305 CLOT INHIBIT PROT S TOTAL: CPT | Performed by: PHYSICIAN ASSISTANT

## 2022-03-29 PROCEDURE — G1004 CDSM NDSC: HCPCS

## 2022-03-29 PROCEDURE — 86147 CARDIOLIPIN ANTIBODY EA IG: CPT | Performed by: PHYSICIAN ASSISTANT

## 2022-03-29 PROCEDURE — 85732 THROMBOPLASTIN TIME PARTIAL: CPT | Performed by: PHYSICIAN ASSISTANT

## 2022-03-29 PROCEDURE — 99285 EMERGENCY DEPT VISIT HI MDM: CPT | Performed by: EMERGENCY MEDICINE

## 2022-03-29 PROCEDURE — 85025 COMPLETE CBC W/AUTO DIFF WBC: CPT | Performed by: EMERGENCY MEDICINE

## 2022-03-29 PROCEDURE — 99220 PR INITIAL OBSERVATION CARE/DAY 70 MINUTES: CPT | Performed by: PHYSICIAN ASSISTANT

## 2022-03-29 PROCEDURE — 99205 OFFICE O/P NEW HI 60 MIN: CPT | Performed by: PSYCHIATRY & NEUROLOGY

## 2022-03-29 PROCEDURE — 80053 COMPREHEN METABOLIC PANEL: CPT | Performed by: EMERGENCY MEDICINE

## 2022-03-29 PROCEDURE — 36415 COLL VENOUS BLD VENIPUNCTURE: CPT | Performed by: EMERGENCY MEDICINE

## 2022-03-29 PROCEDURE — 85705 THROMBOPLASTIN INHIBITION: CPT | Performed by: PHYSICIAN ASSISTANT

## 2022-03-29 RX ORDER — ASPIRIN 81 MG/1
81 TABLET, CHEWABLE ORAL DAILY
Status: DISCONTINUED | OUTPATIENT
Start: 2022-03-30 | End: 2022-03-30 | Stop reason: HOSPADM

## 2022-03-29 RX ORDER — ATORVASTATIN CALCIUM 80 MG/1
80 TABLET, FILM COATED ORAL
Status: DISCONTINUED | OUTPATIENT
Start: 2022-03-29 | End: 2022-03-30 | Stop reason: HOSPADM

## 2022-03-29 RX ORDER — ASPIRIN 325 MG
325 TABLET ORAL ONCE
Status: COMPLETED | OUTPATIENT
Start: 2022-03-29 | End: 2022-03-29

## 2022-03-29 RX ORDER — AMLODIPINE BESYLATE 5 MG/1
1 TABLET ORAL DAILY
COMMUNITY
Start: 2022-03-28

## 2022-03-29 RX ORDER — ACETAMINOPHEN 325 MG/1
650 TABLET ORAL EVERY 6 HOURS PRN
Status: DISCONTINUED | OUTPATIENT
Start: 2022-03-29 | End: 2022-03-30 | Stop reason: HOSPADM

## 2022-03-29 RX ORDER — AMLODIPINE BESYLATE 5 MG/1
5 TABLET ORAL DAILY
Status: DISCONTINUED | OUTPATIENT
Start: 2022-03-30 | End: 2022-03-30 | Stop reason: HOSPADM

## 2022-03-29 RX ADMIN — IOHEXOL 85 ML: 350 INJECTION, SOLUTION INTRAVENOUS at 08:55

## 2022-03-29 RX ADMIN — ENOXAPARIN SODIUM 40 MG: 40 INJECTION SUBCUTANEOUS at 14:11

## 2022-03-29 RX ADMIN — ATORVASTATIN CALCIUM 80 MG: 80 TABLET, FILM COATED ORAL at 16:23

## 2022-03-29 RX ADMIN — ASPIRIN 325 MG ORAL TABLET 325 MG: 325 PILL ORAL at 10:21

## 2022-03-29 NOTE — CONSULTS
Consultation - Neurology   Bob Jarquin 59 y o  male MRN: [id number]  Unit/Bed#: E4 -01 Encounter: [id number]      Assessment/Plan     * Stroke  Assessment & Plan  61-year-old male with hypertension, prediabetes, history of DVT previously on Xarelto, history of diverticulosis with rectal bleeding, presents with stroke found on outpatient MRI brain  -MRI demonstrated acute stroke in the right centrum semiovale extending into the right lentiform nucleus  There was minimal chronic small vessel cerebrovascular ischemic disease  -CTA head/neck was performed in the ED which demonstrated some moderate right ICA stenosis but was otherwise unremarkable  There was no LVO  -Blood pressure on presentation was 137/89  -  A1c 6 5   -Lower extremity Dopplers negative for DVTs bilaterally  Patient stopped taking Xarelto on 12/25/2021 after being on this medication for at least 1 year, patient reports for DVT  States he did have thrombosis panel, which was negative, and DVTs were attributed to frequent flights for work  No family history of blood clots but father had significant stroke around same age as patient  Patient reports continued mental fogginess and some mild swallowing difficulty but neurologic exam objectively normal except for subtle left facial asymmetry  Though location of stroke is typical for small vessel etiology, moderately large size of stroke and lack of significant chronic small vessel cerebrovascular ischemic disease or significant amount of intra/extracranial atherosclerosis raises suspicion of proximal embolic etiology  Plan:  -will give aspirin 325 mg x 1 and continue aspirin 81 mg daily for now  -start atorvastatin 80 mg daily   -discussed with Cardiology  PEGGY to be performed  If unrevealing, will likely recommend loop recorder implantation   -thrombosis panel ordered   -goal normotension as stroke likely occurred on 03/24/2022   -telemetry    -PT/OT/ST and stroke education  History of DVT (deep vein thrombosis)  Assessment & Plan  Patient reports history of DVTs, previously on Xarelto as noted above  Xarelto stopped by patient on 12/25/2021 due to rectal bleeding related to patient's diverticulosis  Patient reports normal thrombosis panel in the past and DVTs were attributed to frequent flights for work   -thrombosis panel pending   -Doppler (-) for DVTs  Hypertension  Assessment & Plan  Per patient, recently started on antihypertensive  Goal normotension as stroke likely occurred on 03/24/2022  Jordan Aragon will need follow up in in 6 weeks with neurovascular attending  He will not require outpatient neurological testing  History of Present Illness     Reason for Consult / Principal Problem:  Acute stroke noted on outpatient MRI brain  Hx and PE limited by:  N/A  HPI: Jordan Aragon is a 59 y o  right handed male with hypertension, prediabetes, history of DVT previously on Xarelto, history of diverticulitis, presents with stroke found on outpatient MRI brain  On Thursday 03/24/2022, patient was at work as a  when he noticed he was having difficulty typing, with frequent typos  He felt he needed to correct his typing numerous times and was often incorrect with his attempted correction  This was very unlike him  He then had difficulty following along during his work meetings  He feels that he did not have a coordination issue per se, but more of a cognitive difficulty  He decided to take the rest of the day off  He returned to work the next day, but continued to have [place] cognition  Over the weekend, he felt quite fatigued, prompting him to cancel plans and rest    He denied any lateralized weakness or numbness, dysarthria, or aphasia, though he feels he has been slow to respond due to his cognitive difficulty  He has noticed some coughing/choking while eating since symptom onset    On Monday 03/28/2022, he visited his family physician who ordered laboratory blood work and an MRI of his brain, which was completed early this morning  Patient was sent to the ED from the MRI department as his MRI demonstrated acute stroke in the right centrum semiovale extending into the right lentiform nucleus  CTA head/neck was performed in the ED which demonstrated some moderate right ICA stenosis but was otherwise unremarkable  There was no LVO  Blood pressure on presentation was 137/89  Patient was previously on Xarelto for at least 1 year following diagnosis of DVT, which he attributed to his frequent flights for work  He states he did have a thrombosis panel, which was negative, and he did not follow with Hematology  He was having some difficulty with diverticular bleeding over Christmas 2021 and decided to discontinue his Xarelto at that time and has remained off ever since  The only medication he takes regularly is an antihypertensive which was started recently  He notes he does not have a family history of blood clots but his father had a severe stroke around patient's current age  On exam, patient has subtle left resting facial weakness but no other significant findings  Patient still feels that his cognition is not at his baseline  Inpatient consult to Neurology  Consult performed by: Jeremiah Park PA-C  Consult ordered by: Chelsea Joseph PA-C          Review of Systems   Constitutional: Negative  HENT: Negative  Eyes: Negative  Respiratory: Negative  Cardiovascular: Negative  Gastrointestinal: Negative  Endocrine: Negative  Genitourinary: Negative  Musculoskeletal: Negative  Skin: Negative for rash  Allergic/Immunologic: Negative  Neurological:        As above  Hematological: Negative      Psychiatric/Behavioral:        Mental fogginess and slowed cognition since 03/24/2022       Historical Information   Past Medical History:   Diagnosis Date    Diverticulosis     Hypertension  Mononucleosis     Prediabetes      History reviewed  No pertinent surgical history  Social History   Social History     Substance and Sexual Activity   Alcohol Use Yes    Comment: socially     Social History     Substance and Sexual Activity   Drug Use Never     E-Cigarette/Vaping    E-Cigarette Use Never User      E-Cigarette/Vaping Substances    Nicotine No     THC No     CBD No     Flavoring No     Other No     Unknown No      Social History     Tobacco Use   Smoking Status Never Smoker   Smokeless Tobacco Never Used     Family History: History reviewed  No pertinent family history  Review of previous medical records was completed  Meds/Allergies   PTA meds:   Prior to Admission Medications   Prescriptions Last Dose Informant Patient Reported? Taking? amLODIPine (NORVASC) 5 mg tablet 3/28/2022 at Unknown time  Yes Yes   Sig: Take 1 tablet by mouth daily   rivaroxaban (XARELTO) 15 & 20 MG starter pack Not Taking at Unknown time  No No   Sig: Take 15 mg by mouth twice daily for 21 days, then 20 mg once daily thereafter  Patient not taking: Reported on 3/29/2022       Facility-Administered Medications: None       No Known Allergies    Objective   Vitals:Blood pressure 150/94, pulse 79, temperature 98 °F (36 7 °C), temperature source Oral, resp  rate 18, height 5' 6" (1 676 m), weight 59 9 kg (132 lb 0 9 oz), SpO2 97 %  ,Body mass index is 21 31 kg/m²  No intake or output data in the 24 hours ending 03/29/22 1437    Invasive Devices: Invasive Devices  Report    Peripheral Intravenous Line            Peripheral IV 03/29/22 Left Antecubital <1 day                Physical Exam   General:  Patient is well-developed, well-nourished, and in no acute distress  HEENT:  Head normocephalic  Eyes anicteric  Cardiovascular:  With regular rhythm  Lungs:  Normal effort  Nonlabored breathing  Extremities:  With no significant edema  Skin: No rashes      Neurologic Exam  Neurologic:  Patient is alert, pleasantly interactive, and appropriately conversational   No obvious symbolic language difficulty or dysarthria, and the patient is fully oriented  Correctly identified the current president  Gait deferred for safety  Cranial Nerves:   II: Visual fields full to confrontation  Pupils equal, round, reactive to light with normal accomodation  Cannot visualize optic fundi  III,IV,VI: extraocular movements intact with no nystagmus  V: Sensation in the V1 through V3 distributions intact to light touch bilaterally  VII:  Slightly smaller left palpebral fissure  Subtle left nasolabial flattening/less spontaneous movement of the left lower face but symmetrical smile  VIII: Audition intact to finger rub bilaterally  IX/X: Uvula midline  Soft palate elevation symmetric  XII: Tongue midline with no atrophy or fasciculations with appropriate movement  Coordination:  Accurate with finger-to-nose and heel-to-shin maneuvers bilaterally  Motor testing with no upper or lower extremity drift  Full strength throughout the 4 extremities  Sensory testing grossly intact to light touch and temperature throughout  No extinction with double simultaneous stimulation  Reflexes 2 bilateral upper extremities, 2 bilateral knees, trace bilateral ankles  Toe responses are downgoing bilaterally      Lab Results:   CBC:   Results from last 7 days   Lab Units 03/29/22  0746   WBC Thousand/uL 6 80   RBC Million/uL 4 86   HEMOGLOBIN g/dL 15 3   HEMATOCRIT % 44 6   MCV fL 92   PLATELETS Thousands/uL 231   , BMP/CMP:   Results from last 7 days   Lab Units 03/29/22  0746   SODIUM mmol/L 141   POTASSIUM mmol/L 4 0   CHLORIDE mmol/L 105   CO2 mmol/L 30   BUN mg/dL 11   CREATININE mg/dL 0 98   CALCIUM mg/dL 8 6   AST U/L 15   ALT U/L 23   ALK PHOS U/L 90   EGFR ml/min/1 73sq m 84   , HgBA1C:   Results from last 7 days   Lab Units 03/29/22  0746   HEMOGLOBIN A1C % 6 5*   , Lipid Profile:   Results from last 7 days Lab Units 03/29/22  0746   HDL mg/dL 51   LDL CALC mg/dL 136*   TRIGLYCERIDES mg/dL 129     Imaging Studies: I have personally reviewed pertinent reports  and I have personally reviewed pertinent films in PACS MRI brain, CTA h/n  EKG, Pathology, and Other Studies: I have personally reviewed pertinent reports      VTE Prophylaxis: Sequential compression device Carlos Jane)     Code Status: Level 1 - Full Code  Advance Directive and Living Will:      Power of :    POLST:

## 2022-03-29 NOTE — CONSULTS
Consult - Cardiology   Lawrence Soulier 59 y o  male MRN: [id number]  Unit/Bed#: ED 10 Encounter: [id number]        Reason For Consult:  Evaluation for PEGGY                ASSESSMENT:  Preprocedure evaluation for PEGGY   Stroke   - MRI completed today revealed recent infarct right centrum semiovale extending into the right lentiform nucleus measures 2 9 x 1 6 cm  No hemorrhage identified     - CTA completed today revealed stable nonhemorrhagic subacute infarction in the right basal ganglia/lentiform nucleus with suggestion of moderate stenosis in the ipsilateral horizontal LAZARO  History of DVT, previously on Xarelto  Hypertension    - outpatient Rx, amlodipine 5 mg daily  Prediabetes  History of diverticulitis    PLAN/ DISCUSSION:     · Cardiology consultation placed for pre-procedure evaluation for PEGGY  Per patient, he denies esophageal or gastric surgery  The procedure itself was reviewed with the patient including discussion of risks and benefits  All questions were answered and patient wishes to proceed  · Schedule permitting, will make patient NPO at midnight for potential PEGGY tomorrow  · Recommendations forthcoming pending PEGGY results; may need to consider loop recorder implantation  · Bilateral lower extremity duplex to be completed  · S/P aspirin 325 mg x 1 upon arrival; Initiated on high- intensity statin with atorvastatin 80 mg daily  · Neurology consultation to follow  History Of Present Illness:  59year old male presented to [facility] emergency department post completing outpatient MRI of the brain which revealed stroke  On Thursday, 3/24/22, while patient was at work, he noticed that he was having difficulty typing and had frequent typos  Patient felt he needed to correct his typing numerous times and was often incorrect with his attempted correction  This is very unlike him  He did had difficulty following along during his work meetings    He felt that he had more of a cognitive difficulty  Due to this, patient decided to take the rest of the day off  The next day, patient had returned to work but continued to have foggy cognition  Over the weekend, the patient felt quite fatigued thus prompting patient to cancel his plans and rest  He feels as though he has been slow to respond, but denied lateralized weakness or numbness  Yesterday, 3/28/22, patient evaluated by his family physician whom ordered laboratory work in addition to MRI of the brain  Post MRI, patient was sent directly to the emergency department as the Mri demonstrated acute stroke in the right centrum semiovale extending into the right lentiform nucleus  Patient was previously on Xarelto for at least 1 year following DVT  DVT attributed to frequent fights for work  Patient was having some difficulty with a suspected bout of diverticulitis over Christmas 2021, Xarelto discontinued and patient had remained off since  Upon assessment and evaluation in the emergency department, patient resting comfortably on the stretcher  Patient denies shortness of breath, chest pain, dizziness, lightheadedness, syncope, presyncope  Per patient, he denies esophageal or gastric surgery  He states he did have some difficulty swallowing over the past few days, however is not attributing this to stroke  He did not have any difficulties prior to this  Past Medical History:        Past Medical History:   Diagnosis Date    Hypertension     Mononucleosis     Prediabetes     History reviewed  No pertinent surgical history  Allergy:        No Known Allergies    Medications:       Prior to Admission medications    Medication Sig Start Date End Date Taking? Authorizing Provider   amLODIPine (NORVASC) 5 mg tablet Take 1 tablet by mouth daily 3/28/22  Yes Historical Provider, MD   rivaroxaban (XARELTO) 15 & 20 MG starter pack Take 15 mg by mouth twice daily for 21 days, then 20 mg once daily thereafter    Patient not taking: Reported on 3/29/2022  10/18/19   Chava Teresa MD       Family History:     History reviewed  No pertinent family history  Social History:       Social History     Socioeconomic History    Marital status: /Civil Union     Spouse name: None    Number of children: None    Years of education: None    Highest education level: None   Occupational History    None   Tobacco Use    Smoking status: Never Smoker    Smokeless tobacco: Never Used   Vaping Use    Vaping Use: Never used   Substance and Sexual Activity    Alcohol use: Yes     Comment: socially    Drug use: Never    Sexual activity: None   Other Topics Concern    None   Social History Narrative    None     Social Determinants of Health     Financial Resource Strain: Not on file   Food Insecurity: Not on file   Transportation Needs: Not on file   Physical Activity: Not on file   Stress: Not on file   Social Connections: Not on file   Intimate Partner Violence: Not on file   Housing Stability: Not on file       ROS:  Negative except otherwise aforementioned above  Remainder review of systems is negative    Exam:  General:  alert, oriented and in no distress, cooperative  Head: Normocephalic, atraumatic  Eyes:  EOMI  Pupils - equal, round, reactive to accomodation  No icterus  Normal Conjunctiva     Oropharynx: moist and normal-appearing mucosa  Neck: supple, symmetrical, trachea midline   Heart: regular rate, regular rhythm, S1, S2   Respiratory effort / Chest Inspection: unlabored  Lungs:  normal air entry, lungs clear to auscultation and no rales, rhonchi or wheezing   Abdomen: flat, normal findings: bowel sounds normal and soft, non-tender  Lower Limbs:  no pitting edema  Musculoskeletal: ROM grossly normal    DATA:      ECG:                 Normal sinus rhythm  Possible Septal infarct , age undetermined  Abnormal ECG  When compared with ECG of 09-AUG-2001 14:52,  No significant change was found  Confirmed by Darling Ivey ([id number]) on 3/29/2022 10:07:29 AM          Weights: Wt Readings from Last 3 Encounters:   03/29/22 59 9 kg (132 lb 0 9 oz)   , Body mass index is 21 31 kg/m²           Lab Studies:           Results from last 7 days   Lab Units 03/29/22  0746   TRIGLYCERIDES mg/dL 129   HDL mg/dL 51     Results from last 7 days   Lab Units 03/29/22  0746   WBC Thousand/uL 6 80   HEMOGLOBIN g/dL 15 3   HEMATOCRIT % 44 6   PLATELETS Thousands/uL 231   ,   Results from last 7 days   Lab Units 03/29/22  0746   POTASSIUM mmol/L 4 0   CHLORIDE mmol/L 105   CO2 mmol/L 30   BUN mg/dL 11   CREATININE mg/dL 0 98   CALCIUM mg/dL 8 6   ALK PHOS U/L 90   ALT U/L 23   AST U/L 15

## 2022-03-29 NOTE — ASSESSMENT & PLAN NOTE
Patient keeps a blood pressure log at home and brought this to his primary care provider appointment on Monday where he was started on amlodipine 5 mg once daily  No need for permissive hypertension as he is out of the window  Blood pressure currently 124/85  Monitor vital signs

## 2022-03-29 NOTE — ASSESSMENT & PLAN NOTE
Patient is a 59year old male recent diagnosis hypertension, prediabetes, history DVT previously stop taking December, diverticulitis  Patient was at work Thursday when he developed confusion, saw his PCP on Monday who ordered MRI brain and started him on norvasc 5 mg once daily  ·  MRI today confirmed recent infarct right centrum semiovale extending into the right lentiform nucleus measures 2 9 x 1 6 cm  No hemorrhage identified  · CTA head/neck:  No large vessel occlusion, Stable nonhemorrhagic subacute infarction in the right basal ganglia/lentiform nucleus with suggestion of moderate stenosis in the ipsilateral horizontal LAZARO    · Seen in consultation with Cardiology and Neurology  · PEGGY tomorrow, NPO after midnight  · Loaded aspirin in the emergency department and started on Lipitor 80 mg daily  · Continue aspirin 81 mg daily thereafter  · A1c 6 5%  · Lipid panel:  , HDL 51, triglyceride 129, total cholesterol 213  · Telemetry monitoring  · Bilateral venous duplex negative  · Follow-up thrombosis panel  ·

## 2022-03-29 NOTE — PLAN OF CARE
Problem: Potential for Falls  Goal: Patient will remain free of falls  Description: INTERVENTIONS:  - Educate patient/family on patient safety including physical limitations  - Instruct patient to call for assistance with activity   - Consult OT/PT to assist with strengthening/mobility   - Keep Call bell within reach  - Keep bed low and locked with side rails adjusted as appropriate  - Keep care items and personal belongings within reach  - Initiate and maintain comfort rounds  - Make Fall Risk Sign visible to staff  - Offer Toileting every 2 Hours, in advance of need  - Initiate/Maintain bed alarm  - Obtain necessary fall risk management equipment:   - Apply yellow socks and bracelet for high fall risk patients  - Consider moving patient to room near nurses station  Outcome: Progressing     Problem: Neurological Deficit  Goal: Neurological status is stable or improving  Description: Interventions:  - Monitor and assess patient's level of consciousness, motor function, sensory function, and level of assistance needed for ADLs  - Monitor and report changes from baseline  Collaborate with interdisciplinary team to initiate plan and implement interventions as ordered  - Provide and maintain a safe environment  - Consider seizure precautions  - Consider fall precautions  - Consider aspiration precautions  - Consider bleeding precautions  Outcome: Progressing     Problem: Activity Intolerance/Impaired Mobility  Goal: Mobility/activity is maintained at optimum level for patient  Description: Interventions:  - Assess and monitor patient  barriers to mobility and need for assistive/adaptive devices  - Assess patient's emotional response to limitations  - Collaborate with interdisciplinary team and initiate plans and interventions as ordered  - Encourage independent activity per ability   - Maintain proper body alignment  - Perform active/passive rom as tolerated/ordered    - Plan activities to conserve energy   - Turn patient as appropriate  Outcome: Progressing     Problem: Communication Impairment  Goal: Ability to express needs and understand communication  Description: Assess patient's communication skills and ability to understand information  Patient will demonstrate use of effective communication techniques, alternative methods of communication and understanding even if not able to speak  - Encourage communication and provide alternate methods of communication as needed  - Collaborate with case management/ for discharge needs  - Include patient/family/caregiver in decisions related to communication  Outcome: Progressing     Problem: Potential for Aspiration  Goal: Non-ventilated patient's risk of aspiration is minimized  Description: Assess and monitor vital signs, respiratory status, and labs (WBC)  Monitor for signs of aspiration (tachypnea, cough, rales, wheezing, cyanosis, fever)  - Assess and monitor patient's ability to swallow  - Place patient up in chair to eat if possible  - HOB up at 90 degrees to eat if unable to get patient up into chair   - Supervise patient during oral intake  - Instruct patient/ family to take small bites  - Instruct patient/ family to take small single sips when taking liquids  - Follow patient-specific strategies generated by speech pathologist   Outcome: Progressing  Goal: Ventilated patient's risk of aspiration is minimized  Description: Assess and monitor vital signs, respiratory status, airway cuff pressure, and labs (WBC)  Monitor for signs of aspiration (tachypnea, cough, rales, wheezing, cyanosis, fever)  - Elevate head of bed 30 degrees if patient has tube feeding   - Monitor tube feeding    Outcome: Progressing     Problem: Nutrition  Goal: Nutrition/Hydration status is improving  Description: Monitor and assess patient's nutrition/hydration status for malnutrition (ex- brittle hair, bruises, dry skin, pale skin and conjunctiva, muscle wasting, smooth red tongue, and disorientation)  Collaborate with interdisciplinary team and initiate plan and interventions as ordered  Monitor patient's weight and dietary intake as ordered or per policy  Utilize nutrition screening tool and intervene per policy  Determine patient's food preferences and provide high-protein, high-caloric foods as appropriate  - Assist patient with eating   - Allow adequate time for meals   - Encourage patient to take dietary supplement as ordered  - Collaborate with clinical nutritionist   - Include patient/family/caregiver in decisions related to nutrition    Outcome: Progressing     Problem: PAIN - ADULT  Goal: Verbalizes/displays adequate comfort level or baseline comfort level  Description: Interventions:  - Encourage patient to monitor pain and request assistance  - Assess pain using appropriate pain scale  - Administer analgesics based on type and severity of pain and evaluate response  - Implement non-pharmacological measures as appropriate and evaluate response  - Consider cultural and social influences on pain and pain management  - Notify physician/advanced practitioner if interventions unsuccessful or patient reports new pain  Outcome: Progressing     Problem: INFECTION - ADULT  Goal: Absence or prevention of progression during hospitalization  Description: INTERVENTIONS:  - Assess and monitor for signs and symptoms of infection  - Monitor lab/diagnostic results  - Monitor all insertion sites, i e  indwelling lines, tubes, and drains  - Monitor endotracheal if appropriate and nasal secretions for changes in amount and color  - [place] appropriate cooling/warming therapies per order  - Administer medications as ordered  - Instruct and encourage patient and family to use good hand hygiene technique  - Identify and instruct in appropriate isolation precautions for identified infection/condition  Outcome: Progressing  Goal: Absence of fever/infection during neutropenic period  Description: INTERVENTIONS:  - Monitor WBC    Outcome: Progressing     Problem: DISCHARGE PLANNING  Goal: Discharge to home or other facility with appropriate resources  Description: INTERVENTIONS:  - Identify barriers to discharge w/patient and caregiver  - Arrange for needed discharge resources and transportation as appropriate  - Identify discharge learning needs (meds, wound care, etc )  - Arrange for interpretive services to assist at discharge as needed  - Refer to Case Management Department for coordinating discharge planning if the patient needs post-hospital services based on physician/advanced practitioner order or complex needs related to functional status, cognitive ability, or social support system  Outcome: Progressing     Problem: Knowledge Deficit  Goal: Patient/family/caregiver demonstrates understanding of disease process, treatment plan, medications, and discharge instructions  Description: Complete learning assessment and assess knowledge base    Interventions:  - Provide teaching at level of understanding  - Provide teaching via preferred learning methods  Outcome: Progressing

## 2022-03-29 NOTE — ED PROVIDER NOTES
History  Chief Complaint   Patient presents with    Abnormal Lab     Pt reports being sent here from MRI with abnormal findings  Thursday started with confusion, weakness over the weekend  has since resolved  58-year-old male previous history of provoked PE on Xarelto until December and hypertension presenting from Radiology due to recent stroke  Patient reports acute onset keyboard to typing difficulty on Thursday  States that he types a lot for work and noticed that he was making typos on every 3rd or 4th word  He also developed some head fogginess  Denies any focal weakness or numbness tingling or any vision or speech changes or other neurological changes  Had outpatient MRI head ordered completed today with findings of acute infarct right centrum semiovale extending into the right lentiform nucleus  Denies any new symptoms or worsening symptoms  States he has still been working and is not extremely limited  Denies any other complaints  Prior to Admission Medications   Prescriptions Last Dose Informant Patient Reported? Taking? amLODIPine (NORVASC) 5 mg tablet 3/28/2022 at Unknown time  Yes Yes   Sig: Take 1 tablet by mouth daily   rivaroxaban (XARELTO) 15 & 20 MG starter pack Not Taking at Unknown time  No No   Sig: Take 15 mg by mouth twice daily for 21 days, then 20 mg once daily thereafter  Patient not taking: Reported on 3/29/2022       Facility-Administered Medications: None       Past Medical History:   Diagnosis Date    Diverticulosis     Hypertension     Mononucleosis     Prediabetes        History reviewed  No pertinent surgical history  History reviewed  No pertinent family history  I have reviewed and agree with the history as documented      E-Cigarette/Vaping    E-Cigarette Use Never User      E-Cigarette/Vaping Substances    Nicotine No     THC No     CBD No     Flavoring No     Other No     Unknown No      Social History     Tobacco Use    Smoking status: Never Smoker    Smokeless tobacco: Never Used   Vaping Use    Vaping Use: Never used   Substance Use Topics    Alcohol use: Yes     Comment: socially    Drug use: Never        Review of Systems   Constitutional: Negative for appetite change, chills, diaphoresis, fever and unexpected weight change  HENT: Negative for congestion and rhinorrhea  Eyes: Negative for photophobia and visual disturbance  Respiratory: Negative for cough, chest tightness and shortness of breath  Cardiovascular: Negative for chest pain, palpitations and leg swelling  Gastrointestinal: Negative for abdominal distention, abdominal pain, blood in stool, constipation, diarrhea, nausea and vomiting  Genitourinary: Negative for dysuria and hematuria  Musculoskeletal: Negative for back pain, joint swelling, neck pain and neck stiffness  Skin: Negative for color change, pallor, rash and wound  Neurological: Negative for dizziness, syncope, weakness, light-headedness and headaches  Typing difficulty   Psychiatric/Behavioral: Positive for confusion  Negative for agitation  All other systems reviewed and are negative  Physical Exam  ED Triage Vitals   Temperature Pulse Respirations Blood Pressure SpO2   03/29/22 0740 03/29/22 0731 03/29/22 0731 03/29/22 0731 03/29/22 0731   98 °F (36 7 °C) 69 19 137/89 98 %      Temp Source Heart Rate Source Patient Position - Orthostatic VS BP Location FiO2 (%)   03/29/22 0740 03/29/22 0731 03/29/22 0731 03/29/22 0731 --   Oral Monitor Sitting Right arm       Pain Score       03/29/22 0731       No Pain             Orthostatic Vital Signs  Vitals:    03/29/22 0731 03/29/22 0936 03/29/22 1122   BP: 137/89 133/88 132/81   Pulse: 69 68 70   Patient Position - Orthostatic VS: Sitting Lying Lying       Physical Exam  Vitals and nursing note reviewed  Constitutional:       General: He is not in acute distress  Appearance: Normal appearance  He is well-developed   He is not ill-appearing, toxic-appearing or diaphoretic  HENT:      Head: Normocephalic and atraumatic  Nose: Nose normal  No congestion or rhinorrhea  Mouth/Throat:      Mouth: Mucous membranes are moist       Pharynx: Oropharynx is clear  No oropharyngeal exudate or posterior oropharyngeal erythema  Eyes:      General: No scleral icterus  Right eye: No discharge  Left eye: No discharge  Extraocular Movements: Extraocular movements intact  Conjunctiva/sclera: Conjunctivae normal       Pupils: Pupils are equal, round, and reactive to light  Neck:      Vascular: No JVD  Trachea: No tracheal deviation  Cardiovascular:      Rate and Rhythm: Normal rate and regular rhythm  Heart sounds: Normal heart sounds  No murmur heard  No friction rub  No gallop  Comments: Normal rate and regular rhythm  Pulmonary:      Effort: Pulmonary effort is normal  No respiratory distress  Breath sounds: Normal breath sounds  No stridor  No wheezing or rales  Comments: Clear to auscultation bilaterally  Chest:      Chest wall: No tenderness  Abdominal:      General: Bowel sounds are normal  There is no distension  Palpations: Abdomen is soft  Tenderness: There is no abdominal tenderness  There is no right CVA tenderness, left CVA tenderness, guarding or rebound  Comments: Soft, nontender, nondistended  Normal bowel sounds throughout   Musculoskeletal:         General: No swelling, tenderness, deformity or signs of injury  Normal range of motion  Cervical back: Normal range of motion and neck supple  No rigidity  No muscular tenderness  Right lower leg: No edema  Left lower leg: No edema  Lymphadenopathy:      Cervical: No cervical adenopathy  Skin:     General: Skin is warm and dry  Coloration: Skin is not pale  Findings: No erythema or rash  Neurological:      General: No focal deficit present        Mental Status: He is alert and oriented to person, place, and time  Mental status is at baseline  Cranial Nerves: No cranial nerve deficit  Sensory: No sensory deficit  Motor: No weakness or abnormal muscle tone  Coordination: Coordination normal       Gait: Gait normal       Comments: A&Ox3 to person, place, and time  CN 2-12 intact  Strength 5/5 throughout  Sensation intact throughout  Cerebellar exam including gait intact  Psychiatric:         Behavior: Behavior normal          Thought Content: Thought content normal          ED Medications  Medications   atorvastatin (LIPITOR) tablet 80 mg (has no administration in time range)   iohexol (OMNIPAQUE) 350 MG/ML injection (SINGLE-DOSE) 85 mL (85 mL Intravenous Given 3/29/22 0855)   aspirin tablet 325 mg (325 mg Oral Given 3/29/22 1021)       Diagnostic Studies  Results Reviewed     Procedure Component Value Units Date/Time    Protein S activity [[id number]] Collected: 03/29/22 1026    Lab Status: In process Specimen: Blood from Arm, Left Updated: 03/29/22 1036    Protein C activity [[id number]] Collected: 03/29/22 1026    Lab Status: In process Specimen: Blood from Arm, Left Updated: 03/29/22 1036    Prothrombin gene mutation [[id number]] Collected: 03/29/22 1026    Lab Status: In process Specimen: Blood from Arm, Left Updated: 03/29/22 1036    Factor 5 leiden [[id number]] Collected: 03/29/22 1026    Lab Status: In process Specimen: Blood from Arm, Left Updated: 03/29/22 1036    Protein S Antigen, Total & Free [[id number]] Collected: 03/29/22 1026    Lab Status: In process Specimen: Blood from Arm, Left Updated: 03/29/22 1035    Lupus anticoagulant [[id number]] Collected: 03/29/22 1026    Lab Status: In process Specimen: Blood from Arm, Left Updated: 03/29/22 1035    Cardiolipin antibody [[id number]] Collected: 03/29/22 1026    Lab Status:  In process Specimen: Blood from Arm, Left Updated: 03/29/22 1035    Beta-2 glycoprotein antibodies [[id number]] Collected: 03/29/22 1026    Lab Status: In process Specimen: Blood from Arm, Left Updated: 03/29/22 1035    Antithrombin III Activity [[id number]] Collected: 03/29/22 1026    Lab Status:  In process Specimen: Blood from Arm, Left Updated: 03/29/22 1035    Protime-INR [[id number]]  (Normal) Collected: 03/29/22 0746    Lab Status: Final result Specimen: Blood from Arm, Left Updated: 03/29/22 0810     Protime 13 2 seconds      INR 1 02    APTT [[id number]]  (Normal) Collected: 03/29/22 0746    Lab Status: Final result Specimen: Blood from Arm, Left Updated: 03/29/22 0810     PTT 25 seconds     Lipid panel [[id number]]  (Abnormal) Collected: 03/29/22 0746    Lab Status: Final result Specimen: Blood from Arm, Left Updated: 03/29/22 0808     Cholesterol 213 mg/dL      Triglycerides 129 mg/dL      HDL, Direct 51 mg/dL      LDL Calculated 136 mg/dL      Non-HDL-Chol (CHOL-HDL) 162 mg/dl     Comprehensive metabolic panel [[id number]] Collected: 03/29/22 0746    Lab Status: Final result Specimen: Blood from Arm, Left Updated: 03/29/22 0806     Sodium 141 mmol/L      Potassium 4 0 mmol/L      Chloride 105 mmol/L      CO2 30 mmol/L      ANION GAP 6 mmol/L      BUN 11 mg/dL      Creatinine 0 98 mg/dL      Glucose 140 mg/dL      Calcium 8 6 mg/dL      AST 15 U/L      ALT 23 U/L      Alkaline Phosphatase 90 U/L      Total Protein 7 4 g/dL      Albumin 3 7 g/dL      Total Bilirubin 0 40 mg/dL      eGFR 84 ml/min/1 73sq m     Narrative:      [facility] guidelines for Chronic Kidney Disease (CKD):     Stage 1 with normal or high GFR (GFR > 90 mL/min/1 73 square meters)    Stage 2 Mild CKD (GFR = 60-89 mL/min/1 73 square meters)    Stage 3A Moderate CKD (GFR = 45-59 mL/min/1 73 square meters)    Stage 3B Moderate CKD (GFR = 30-44 mL/min/1 73 square meters)    Stage 4 Severe CKD (GFR = 15-29 mL/min/1 73 square meters)    Stage 5 End Stage CKD (GFR <15 mL/min/1 73 square meters)  Note: GFR calculation is accurate only with a steady state creatinine    CBC and differential [[id number]] Collected: 03/29/22 0746    Lab Status: Final result Specimen: Blood from Arm, Left Updated: 03/29/22 0752     WBC 6 80 Thousand/uL      RBC 4 86 Million/uL      Hemoglobin 15 3 g/dL      Hematocrit 44 6 %      MCV 92 fL      MCH 31 5 pg      MCHC 34 3 g/dL      RDW 12 7 %      MPV 10 6 fL      Platelets 141 Thousands/uL      nRBC 0 /100 WBCs      Neutrophils Relative 57 %      Immat GRANS % 0 %      Lymphocytes Relative 31 %      Monocytes Relative 9 %      Eosinophils Relative 2 %      Basophils Relative 1 %      Neutrophils Absolute 3 89 Thousands/µL      Immature Grans Absolute 0 02 Thousand/uL      Lymphocytes Absolute 2 10 Thousands/µL      Monocytes Absolute 0 60 Thousand/µL      Eosinophils Absolute 0 12 Thousand/µL      Basophils Absolute 0 07 Thousands/µL     Hemoglobin A1C [[id number]] Collected: 03/29/22 0746    Lab Status: In process Specimen: Blood from Arm, Left Updated: 03/29/22 0750                 CTA head and neck with and without contrast   Final Result by Corrina Carvajal MD (03/29 7245)      Limited but diagnostic evaluation due to timing of the contrast bolus  Stable nonhemorrhagic subacute infarction in the right basal ganglia/lentiform nucleus with suggestion of moderate stenosis in the ipsilateral horizontal LAZARO  No large vessel occlusion         I personally discussed this study with [address] [place] [address] on 3/29/2022 at 9:28 AM                      Workstation performed: [id number]         VAS lower limb venous duplex study, complete bilateral    (Results Pending)         Procedures  ECG 12 Lead Documentation Only    Date/Time: 3/29/2022 7:39 AM  Performed by: Jose Abreu MD  Authorized by: Jose Abreu MD     Indications / Diagnosis:  Stroke  ECG reviewed by me, the ED Provider: yes    Patient location:  ED  Previous ECG:     Previous ECG:  Compared to current    Similarity:  No change    Comparison to cardiac monitor: Yes    Interpretation: Interpretation: non-specific    Rate:     ECG rate:  61    ECG rate assessment: normal    Rhythm:     Rhythm: sinus rhythm    Ectopy:     Ectopy: none    QRS:     QRS axis:  Normal    QRS intervals:  Normal  Conduction:     Conduction: normal    ST segments:     ST segments:  Non-specific  T waves:     T waves: non-specific            ED Course                             SBIRT 22yo+      Most Recent Value   SBIRT (22 yo +)    In order to provide better care to our patients, we are screening all of our patients for alcohol and drug use  Would it be okay to ask you these screening questions? Yes Filed at: 03/29/2022 9410   Initial Alcohol Screen: US AUDIT-C     1  How often do you have a drink containing alcohol? 6 Filed at: 03/29/2022 0752   2  How many drinks containing alcohol do you have on a typical day you are drinking? 0 Filed at: 03/29/2022 0752   3a  Male UNDER 65: How often do you have five or more drinks on one occasion? 0 Filed at: 03/29/2022 0752   Audit-C Score 6 Filed at: 03/29/2022 7428   Full Alcohol Screen: US AUDIT    4  How often during the last year have you found that you were not able to stop drinking once you had started? 0 Filed at: 03/29/2022 0752   5  How often during past year have you failed to do what was normally expected of you because of drinking? 0 Filed at: 03/29/2022 0752   6  How often in past year have you needed a first drink in the morning to get yourself going after a heavy drinking session? 0 Filed at: 03/29/2022 0752   7  How often in past year have you had feeling of guilt or remorse after drinking? 0 Filed at: 03/29/2022 0752   8  How often in past year have you been unable to remember what happened night before because you had been drinking? 0 Filed at: 03/29/2022 0752   9  Have you or someone else been injured as a result of your drinking? 0 Filed at: 03/29/2022 0752   10   Has a relative, friend, doctor or other health worker been concerned about your drinking and suggested you cut down?  0 Filed at: 03/29/2022 5154   AUDIT Total Score 6 Filed at: 03/29/2022 4947   MADDISON: How many times in the past year have you    Used an illegal drug or used a prescription medication for non-medical reasons? Never Filed at: 03/29/2022 0543                [facility]  Number of Diagnoses or Management Options  Hand dysfunction  Stroke [facility])  Diagnosis management comments: 57-year-old male previous history of provoked PE on Xarelto until December and hypertension presenting from Radiology due to recent stroke  Acute onset of typing difficulty on Thursday without changes in symptoms with outpatient MRI today demonstrating acute right-sided stroke  Normal neurological exam and patient is stable  Plan for neurological consultation and stroke pathway labs  EKG  Reassess  Neurology recommending CTA head and neck to assess for vascular disease  EKG sinus without acute process  CTA head and neck with some vascular occlusion and redemonstrating known stroke  Neurology recommending admission for further testing  Admitted         Amount and/or Complexity of Data Reviewed  Clinical lab tests: reviewed and ordered  Tests in the radiology section of CPT®: reviewed and ordered  Tests in the medicine section of CPT®: reviewed and ordered  Review and summarize past medical records: yes  Discuss the patient with other providers: yes  Independent visualization of images, tracings, or specimens: yes    Risk of Complications, Morbidity, and/or Mortality  Presenting problems: high  Diagnostic procedures: high  Management options: high    Patient Progress  Patient progress: stable      Disposition  Final diagnoses:   Stroke [facility])   Hand dysfunction     Time reflects when diagnosis was documented in both MDM as applicable and the Disposition within this note     Time User Action Codes Description Comment    3/29/2022  7:58 AM Lamount Scriver Add [I63 9] Stroke ([facility] [address] )     3/29/2022  8:07 AM Lamount Scriver Add [R29 228] Hand dysfunction       ED Disposition     ED Disposition Condition Date/Time Comment    Admit Stable Tue Mar 29, 2022 11:13 AM Case was discussed with JACK and the patient's admission status was agreed to be Admission Status: observation status to the service of Dr Emiliana Clements   Follow-up Information    None         Patient's Medications   Discharge Prescriptions    No medications on file     No discharge procedures on file  PDMP Review     None           ED Provider  Attending physically available and evaluated Nhi Shane I managed the patient along with the ED Attending      Electronically Signed by         Isabell Faulkner MD  03/29/22 4173

## 2022-03-29 NOTE — H&P
[facility] Solitario[facility]er[facility] 1965, 59 y o  male MRN: [id number]  Unit/Bed#: ED 10 Encounter: [id number]  Primary Care Provider: Annalee Ordoñez DO   Date and time admitted to hospital: 3/29/2022  7:29 AM    * Stroke  Assessment & Plan  Patient is a 59year old male recent diagnosis hypertension, prediabetes, history DVT  (stopped taking Xarelto December 2021), diverticulitis  Patient was at work Thursday when he developed confusion, saw his PCP on Monday who ordered MRI brain and started him on norvasc 5 mg once daily  · MRI today confirmed recent infarct right centrum semiovale extending into the right lentiform nucleus measures 2 9 x 1 6 cm  No hemorrhage identified  · CTA head/neck:  No large vessel occlusion, Stable nonhemorrhagic subacute infarction in the right basal ganglia/lentiform nucleus with suggestion of moderate stenosis in the ipsilateral horizontal LAZARO    · Seen in consultation with Cardiology and Neurology  · PEGGY tomorrow, NPO after midnight  · Loaded aspirin in the emergency department and started on Lipitor 80 mg daily  · Continue aspirin 81 mg daily thereafter  · A1c 6 5%  · Lipid panel:  , HDL 51, triglyceride 129, total cholesterol 213  · Telemetry monitoring  · Bilateral venous duplex negative  · Follow-up thrombosis panel  · Stroke pathway, neuro checks     Prediabetes  Assessment & Plan  A1c 6 5%   Diet control , lifestyle modifications     Hyperlipidemia  Assessment & Plan  Started on high-intensity statin with Lipitor 80 mg once daily  See lipid panel above     History of DVT (deep vein thrombosis)  Assessment & Plan  Previously on Xarelto, stop taking December 2021 for history of DVT, it sounds like this was provoked due to long cross-country car rides  Thrombosis panel pending    Hypertension  Assessment & Plan  Patient keeps a blood pressure log at home and brought this to his primary care provider appointment on Monday where he was started on amlodipine 5 mg once daily  No need for permissive hypertension as he is out of the window  Blood pressure currently 124/85  Monitor vital signs      VTE Pharmacologic Prophylaxis: VTE Score: 6 Moderate Risk (Score 3-4) - Pharmacological DVT Prophylaxis Ordered: enoxaparin (Lovenox)  Code Status: No Order level 1  Discussion with family: Patient declined call to   States wife is coming in later today     Anticipated Length of Stay: Patient will be admitted on an observation basis with an anticipated length of stay of less than 2 midnights secondary to stroke  Total Time for Visit, including Counseling / Coordination of Care: 45 minutes Greater than 50% of this total time spent on direct patient counseling and coordination of care  Chief Complaint: stroke confirmed on MRI     History of Present Illness:  Eleanor He is a 59 y o  male with a PMH of hypertension, history of DVT, diverticulitis, hyperlipidemia, prediabetes who presents with confirm stroke outpatient MRI  Patient states on Thursday 03/24/2022 he was at work and developed some confusion when he was type being which was out of the normal for him  He denies any slurred speech, facial droop, extremity weakness or difficulty with walking but he continued to have mental fogginess throughout the day into Friday  He scheduled an appointment with his primary care provider on Monday  He states over the weekend he was very fatigued but did not notice any new symptoms  He went to his primary care provider appointment on Monday who was concerned and ordered him outpatient MRI which he obtained this morning  MRI confirmed acute recent infarct therefore recommendation was for admission to the hospital for further evaluation and evaluation by Neurology  Since Thursday he denies any new symptoms  He feels like he is somewhat back to baseline with some mental fogginess remains but no other acute complaints      Of note, patient checks his blood pressure and keep blood pressure log at home which he brought his primary care provider appointment who started him on amlodipine 5 mg once daily  This is a new medication for the patient  Regarding his history, he does have a history of DVT which appears to be provoked from his long car rides a cross-country  He was placed on Xarelto was on this for about a year and he took himself off of it Christmas 2021 due to, commitment diverticulitis with some rectal bleeding and he did not think it was a good idea to be on Xarelto therefore he stopped taking it and never continued  Review of Systems:  Review of Systems   Constitutional: Negative for chills, diaphoresis and fever  Respiratory: Negative for shortness of breath  Cardiovascular: Negative for chest pain  Gastrointestinal: Negative for diarrhea, nausea and vomiting  Musculoskeletal: Negative for gait problem  Neurological: Negative for dizziness, syncope, facial asymmetry, weakness, light-headedness, numbness and headaches  Psychiatric/Behavioral: Positive for confusion  All other systems reviewed and are negative  Past Medical and Surgical History:   Past Medical History:   Diagnosis Date    Diverticulosis     Hypertension     Mononucleosis     Prediabetes        History reviewed  No pertinent surgical history  Meds/Allergies:  Prior to Admission medications    Medication Sig Start Date End Date Taking? Authorizing Provider   amLODIPine (NORVASC) 5 mg tablet Take 1 tablet by mouth daily 3/28/22  Yes Historical Provider, MD   rivaroxaban (XARELTO) 15 & 20 MG starter pack Take 15 mg by mouth twice daily for 21 days, then 20 mg once daily thereafter  Patient not taking: Reported on 3/29/2022  10/18/19   Sam Santiago MD     I have reviewed home medications with patient personally      Allergies: No Known Allergies    Social History:  Marital Status: /Civil Union   Occupation: computer tech /coding   Patient Pre-hospital Living Situation: Home  Patient Pre-hospital Level of Mobility: walks  Patient Pre-hospital Diet Restrictions:   Substance Use History:   Social History     Substance and Sexual Activity   Alcohol Use Yes    Comment: socially     Social History     Tobacco Use   Smoking Status Never Smoker   Smokeless Tobacco Never Used     Social History     Substance and Sexual Activity   Drug Use Never       Family History:  History reviewed  No pertinent family history  Physical Exam:     Vitals:   Blood Pressure: 124/85 (03/29/22 1222)  Pulse: 64 (03/29/22 1222)  Temperature: 98 °F (36 7 °C) (03/29/22 0740)  Temp Source: Oral (03/29/22 0740)  Respirations: 21 (03/29/22 1222)  Height: 5' 6" (167 6 cm) (03/29/22 0740)  Weight - Scale: 59 9 kg (132 lb 0 9 oz) (03/29/22 0740)  SpO2: 97 % (03/29/22 1222)    Physical Exam  Vitals and nursing note reviewed  Constitutional:       General: He is not in acute distress  Appearance: He is not ill-appearing or toxic-appearing  HENT:      Head: Normocephalic  Eyes:      Conjunctiva/sclera: Conjunctivae normal    Cardiovascular:      Rate and Rhythm: Normal rate and regular rhythm  Pulmonary:      Effort: Pulmonary effort is normal  No respiratory distress  Breath sounds: Normal breath sounds  No wheezing or rales  Abdominal:      General: Bowel sounds are normal       Palpations: Abdomen is soft  Tenderness: There is no abdominal tenderness  There is no guarding  Musculoskeletal:      Right lower leg: No edema  Left lower leg: No edema  Neurological:      Mental Status: He is alert  Mental status is at baseline  Comments:  Answering all questions appropriately, no slurred speech, moving all extremities, eating a sandwich sitting alongside the bed, very good historian   Psychiatric:         Mood and Affect: Mood normal           Additional Data:     Lab Results:  Results from last 7 days   Lab Units 03/29/22 0746   WBC Thousand/uL 6 80   HEMOGLOBIN g/dL 15 3   HEMATOCRIT % 44 6   PLATELETS Thousands/uL 231   NEUTROS PCT % 57   LYMPHS PCT % 31   MONOS PCT % 9   EOS PCT % 2     Results from last 7 days   Lab Units 03/29/22  0746   SODIUM mmol/L 141   POTASSIUM mmol/L 4 0   CHLORIDE mmol/L 105   CO2 mmol/L 30   BUN mg/dL 11   CREATININE mg/dL 0 98   ANION GAP mmol/L 6   CALCIUM mg/dL 8 6   ALBUMIN g/dL 3 7   TOTAL BILIRUBIN mg/dL 0 40   ALK PHOS U/L 90   ALT U/L 23   AST U/L 15   GLUCOSE RANDOM mg/dL 140     Results from last 7 days   Lab Units 03/29/22  0746   INR  1 02         Results from last 7 days   Lab Units 03/29/22  0746   HEMOGLOBIN A1C % 6 5*           Imaging: Reviewed radiology reports from this admission including: CT head and MRI brain  VAS lower limb venous duplex study, complete bilateral   Final Result by Alex Kurtz MD (03/29 1300)      CTA head and neck with and without contrast   Final Result by Akosua Rowe MD (03/29 5997)      Limited but diagnostic evaluation due to timing of the contrast bolus  Stable nonhemorrhagic subacute infarction in the right basal ganglia/lentiform nucleus with suggestion of moderate stenosis in the ipsilateral horizontal LAZARO  No large vessel occlusion  I personally discussed this study with [address] [place] [address] on 3/29/2022 at 9:28 AM                      Workstation performed: [id number]              EKG and Other Studies Reviewed on Admission:   · EKG: NSR  HR 61     ** Please Note: This note has been constructed using a voice recognition system   **

## 2022-03-29 NOTE — ASSESSMENT & PLAN NOTE
Patient reports history of DVTs, previously on Xarelto as noted above  Xarelto stopped by patient on 12/25/2021 due to rectal bleeding related to patient's diverticulosis  Patient reports normal thrombosis panel in the past and DVTs were attributed to frequent flights for work   -thrombosis panel pending   -Doppler (-) for DVTs

## 2022-03-29 NOTE — ED NOTES
Pt provided with lunch tray at this time    NPO effective at MIDNIGHT       Rosy Aguirre  27/30/95 4533

## 2022-03-29 NOTE — ASSESSMENT & PLAN NOTE
Previously on Xarelto, stop taking December 2021 for history of DVT, it sounds like this was provoked due to long cross-country car rides  Thrombosis panel pending

## 2022-03-29 NOTE — ED ATTENDING ATTESTATION
3/29/2022  Sudhir PFEIFFER, saw and evaluated the patient  I have discussed the patient with the resident/non-physician practitioner and agree with the resident's/non-physician practitioner's findings, Plan of Care, and MDM as documented in the resident's/non-physician practitioner's note, except where noted  All available labs and Radiology studies were reviewed  I was present for key portions of any procedure(s) performed by the resident/non-physician practitioner and I was immediately available to provide assistance  At this point I agree with the current assessment done in the Emergency Department  I have conducted an independent evaluation of this patient a history and physical is as follows: A 58 yo male with pmhx of PE (provoked, no longer on anticoagulation) and hypertension; presents from outpatient MRI for an abnormal study  Patient states this past Thursday, 5 days ago, he was at work when he fell confused and had trouble typing  Patient states since then the symptoms have persisted, however he has been able to perform his ADL's and work without difficulty  Patient otherwise denies fever, chills, headache, visual disturbance, dizziness, lightheadedness, chest pain, shortness of breath, abdominal pain, nausea, vomiting, diarrhea, peripheral edema, rashes, paresthesias and focal weakness  Patient was initially sent for an outpatient MRI which was completed this morning, resulting with "Recent infarct right centrum semiovale extending into the right lentiform nucleus measures 2 9 x 1 6 cm  No hemorrhage identified"  Patient was sent to the ED for further management      Physical Exam  General Appearance: alert and oriented, nad, non toxic appearing  Skin:  Warm, dry, intact  HEENT: atraumatic, normocephalic  Neck: Supple, trachea midline  Cardiac: RRR; no murmurs, rub, gallops  Pulmonary: lungs CTAB; no wheezes, rales, rhonchi  Gastrointestinal: abdomen soft, nontender, nondistended; no guarding or rebound tenderness; good bowel sounds, no mass or bruits  Extremities:  no pedal edema, 2+ pulses; no calf tenderness, no clubbing, no cyanosis  Neuro:  CN 2-12 grossly intact  5/5 motor strength to the bilateral upper and lower extremities  Sensation intact throughout  Normal finger-to-nose the bilateral upper extremities  Normal heel-shin to the bilateral lower extremities  Normal gait  Psych:  Normal mood and affect, normal judgement and insight    Assessment and Plan:  Acute CVA, diagnosed on outpatient MRI this morning  Patient currently resting comfortably, neurologically intact  Will check lab work  Will discuss with neuro regarding need for additional imaging and disposition        ED Course         Critical Care Time  Procedures

## 2022-03-29 NOTE — ASSESSMENT & PLAN NOTE
Per patient, recently started on antihypertensive  Goal normotension as stroke likely occurred on 03/24/2022

## 2022-03-29 NOTE — ASSESSMENT & PLAN NOTE
75-year-old male with hypertension, prediabetes, history of DVT previously on Xarelto, history of diverticulosis with rectal bleeding, presents with stroke found on outpatient MRI brain  -MRI demonstrated acute stroke in the right centrum semiovale extending into the right lentiform nucleus  There was minimal chronic small vessel cerebrovascular ischemic disease  -CTA head/neck was performed in the ED which demonstrated some moderate right LAZARO stenosis but was otherwise unremarkable  There was no LVO  -Blood pressure on presentation was 137/89  -  A1c 6 5   -Lower extremity Dopplers negative for DVTs bilaterally  Patient stopped taking Xarelto on 12/25/2021 after being on this medication for at least 1 year, patient reports for DVT  States he did have thrombosis panel, which was negative, and DVTs were attributed to frequent flights for work  No family history of blood clots but father had significant stroke around same age as patient  Though location of stroke is typical for small vessel etiology, moderately large size of stroke and lack of significant chronic small vessel cerebrovascular ischemic disease or significant amount of intra/extracranial atherosclerosis raises suspicion of proximal embolic etiology  Plan:  -will give aspirin 325 mg x 1 and continue aspirin 81 mg daily for now  -start atorvastatin 80 mg daily  -PEGGY to be performed  If unrevealing, will likely recommend loop recorder implantation   -thrombosis panel ordered   -goal normotension as stroke likely occurred on 03/24/2022   -telemetry  -PT/OT/ST and stroke education

## 2022-03-30 ENCOUNTER — ANESTHESIA EVENT (OUTPATIENT)
Dept: NON INVASIVE DIAGNOSTICS | Facility: HOSPITAL | Age: 57
End: 2022-03-30
Payer: COMMERCIAL

## 2022-03-30 ENCOUNTER — PREP FOR PROCEDURE (OUTPATIENT)
Dept: OTHER | Facility: HOSPITAL | Age: 57
End: 2022-03-30

## 2022-03-30 ENCOUNTER — ANESTHESIA (OUTPATIENT)
Dept: NON INVASIVE DIAGNOSTICS | Facility: HOSPITAL | Age: 57
End: 2022-03-30
Payer: COMMERCIAL

## 2022-03-30 ENCOUNTER — APPOINTMENT (OUTPATIENT)
Dept: NON INVASIVE DIAGNOSTICS | Facility: HOSPITAL | Age: 57
End: 2022-03-30
Attending: INTERNAL MEDICINE
Payer: COMMERCIAL

## 2022-03-30 VITALS
BODY MASS INDEX: 21.21 KG/M2 | RESPIRATION RATE: 18 BRPM | OXYGEN SATURATION: 96 % | HEART RATE: 66 BPM | TEMPERATURE: 97 F | WEIGHT: 132 LBS | SYSTOLIC BLOOD PRESSURE: 122 MMHG | HEIGHT: 66 IN | DIASTOLIC BLOOD PRESSURE: 78 MMHG

## 2022-03-30 DIAGNOSIS — I63.9 CEREBROVASCULAR ACCIDENT (CVA) DUE TO VASCULAR OCCLUSION (HCC): Primary | ICD-10-CM

## 2022-03-30 LAB
ANION GAP SERPL CALCULATED.3IONS-SCNC: 6 MMOL/L (ref 4–13)
B2 GLYCOPROT1 IGA SERPL IA-ACNC: 2.2
B2 GLYCOPROT1 IGG SERPL IA-ACNC: 0.8
B2 GLYCOPROT1 IGM SERPL IA-ACNC: <2.9
BASOPHILS # BLD AUTO: 0.08 THOUSANDS/ΜL (ref 0–0.1)
BASOPHILS NFR BLD AUTO: 1 % (ref 0–1)
BUN SERPL-MCNC: 15 MG/DL (ref 5–25)
CALCIUM SERPL-MCNC: 8.8 MG/DL (ref 8.3–10.1)
CARDIOLIPIN IGA SER IA-ACNC: 4.6
CARDIOLIPIN IGG SER IA-ACNC: 0.7
CARDIOLIPIN IGM SER IA-ACNC: 3
CHLORIDE SERPL-SCNC: 105 MMOL/L (ref 100–108)
CO2 SERPL-SCNC: 28 MMOL/L (ref 21–32)
CREAT SERPL-MCNC: 0.8 MG/DL (ref 0.6–1.3)
DEPRECATED AT III PPP: 117 % OF NORMAL (ref 92–136)
EOSINOPHIL # BLD AUTO: 0.1 THOUSAND/ΜL (ref 0–0.61)
EOSINOPHIL NFR BLD AUTO: 2 % (ref 0–6)
ERYTHROCYTE [DISTWIDTH] IN BLOOD BY AUTOMATED COUNT: 12.6 % (ref 11.6–15.1)
GFR SERPL CREATININE-BSD FRML MDRD: 97 ML/MIN/1.73SQ M
GLUCOSE SERPL-MCNC: 143 MG/DL (ref 65–140)
HCT VFR BLD AUTO: 43.4 % (ref 36.5–49.3)
HGB BLD-MCNC: 14.7 G/DL (ref 12–17)
IMM GRANULOCYTES # BLD AUTO: 0.02 THOUSAND/UL (ref 0–0.2)
IMM GRANULOCYTES NFR BLD AUTO: 0 % (ref 0–2)
LYMPHOCYTES # BLD AUTO: 2.14 THOUSANDS/ΜL (ref 0.6–4.47)
LYMPHOCYTES NFR BLD AUTO: 33 % (ref 14–44)
MCH RBC QN AUTO: 31.5 PG (ref 26.8–34.3)
MCHC RBC AUTO-ENTMCNC: 33.9 G/DL (ref 31.4–37.4)
MCV RBC AUTO: 93 FL (ref 82–98)
MONOCYTES # BLD AUTO: 0.57 THOUSAND/ΜL (ref 0.17–1.22)
MONOCYTES NFR BLD AUTO: 9 % (ref 4–12)
NEUTROPHILS # BLD AUTO: 3.65 THOUSANDS/ΜL (ref 1.85–7.62)
NEUTS SEG NFR BLD AUTO: 55 % (ref 43–75)
NRBC BLD AUTO-RTO: 0 /100 WBCS
PLATELET # BLD AUTO: 210 THOUSANDS/UL (ref 149–390)
PMV BLD AUTO: 11 FL (ref 8.9–12.7)
POTASSIUM SERPL-SCNC: 3.8 MMOL/L (ref 3.5–5.3)
RBC # BLD AUTO: 4.67 MILLION/UL (ref 3.88–5.62)
SODIUM SERPL-SCNC: 139 MMOL/L (ref 136–145)
WBC # BLD AUTO: 6.56 THOUSAND/UL (ref 4.31–10.16)

## 2022-03-30 PROCEDURE — 99217 PR OBSERVATION CARE DISCHARGE MANAGEMENT: CPT | Performed by: INTERNAL MEDICINE

## 2022-03-30 PROCEDURE — 93312 ECHO TRANSESOPHAGEAL: CPT | Performed by: INTERNAL MEDICINE

## 2022-03-30 PROCEDURE — 93312 ECHO TRANSESOPHAGEAL: CPT

## 2022-03-30 PROCEDURE — 85025 COMPLETE CBC W/AUTO DIFF WBC: CPT | Performed by: PHYSICIAN ASSISTANT

## 2022-03-30 PROCEDURE — 93320 DOPPLER ECHO COMPLETE: CPT | Performed by: INTERNAL MEDICINE

## 2022-03-30 PROCEDURE — 80048 BASIC METABOLIC PNL TOTAL CA: CPT | Performed by: PHYSICIAN ASSISTANT

## 2022-03-30 PROCEDURE — 99214 OFFICE O/P EST MOD 30 MIN: CPT | Performed by: PSYCHIATRY & NEUROLOGY

## 2022-03-30 PROCEDURE — 93325 DOPPLER ECHO COLOR FLOW MAPG: CPT | Performed by: INTERNAL MEDICINE

## 2022-03-30 PROCEDURE — 97163 PT EVAL HIGH COMPLEX 45 MIN: CPT

## 2022-03-30 RX ORDER — PROPOFOL 10 MG/ML
INJECTION, EMULSION INTRAVENOUS AS NEEDED
Status: DISCONTINUED | OUTPATIENT
Start: 2022-03-30 | End: 2022-03-30

## 2022-03-30 RX ORDER — LIDOCAINE HYDROCHLORIDE 10 MG/ML
INJECTION, SOLUTION EPIDURAL; INFILTRATION; INTRACAUDAL; PERINEURAL AS NEEDED
Status: DISCONTINUED | OUTPATIENT
Start: 2022-03-30 | End: 2022-03-30

## 2022-03-30 RX ORDER — SODIUM CHLORIDE 9 MG/ML
INJECTION, SOLUTION INTRAVENOUS CONTINUOUS PRN
Status: DISCONTINUED | OUTPATIENT
Start: 2022-03-30 | End: 2022-03-30

## 2022-03-30 RX ORDER — PROPOFOL 10 MG/ML
INJECTION, EMULSION INTRAVENOUS CONTINUOUS PRN
Status: DISCONTINUED | OUTPATIENT
Start: 2022-03-30 | End: 2022-03-30

## 2022-03-30 RX ORDER — ATORVASTATIN CALCIUM 80 MG/1
80 TABLET, FILM COATED ORAL
Qty: 90 TABLET | Refills: 0 | Status: SHIPPED | OUTPATIENT
Start: 2022-03-30

## 2022-03-30 RX ORDER — ASPIRIN 81 MG/1
81 TABLET, CHEWABLE ORAL DAILY
Refills: 0
Start: 2022-03-31

## 2022-03-30 RX ADMIN — SODIUM CHLORIDE: 0.9 INJECTION, SOLUTION INTRAVENOUS at 14:15

## 2022-03-30 RX ADMIN — ENOXAPARIN SODIUM 40 MG: 40 INJECTION SUBCUTANEOUS at 09:36

## 2022-03-30 RX ADMIN — LIDOCAINE HYDROCHLORIDE 50 MG: 10 INJECTION, SOLUTION EPIDURAL; INFILTRATION; INTRACAUDAL; PERINEURAL at 14:34

## 2022-03-30 RX ADMIN — ASPIRIN 81 MG CHEWABLE TABLET 81 MG: 81 TABLET CHEWABLE at 09:36

## 2022-03-30 RX ADMIN — AMLODIPINE BESYLATE 5 MG: 5 TABLET ORAL at 09:36

## 2022-03-30 RX ADMIN — PROPOFOL 120 MG: 10 INJECTION, EMULSION INTRAVENOUS at 14:34

## 2022-03-30 RX ADMIN — ATORVASTATIN CALCIUM 80 MG: 80 TABLET, FILM COATED ORAL at 17:30

## 2022-03-30 RX ADMIN — PROPOFOL 20 MG: 10 INJECTION, EMULSION INTRAVENOUS at 14:45

## 2022-03-30 RX ADMIN — PROPOFOL 120 MCG/KG/MIN: 10 INJECTION, EMULSION INTRAVENOUS at 14:34

## 2022-03-30 NOTE — PHYSICAL THERAPY NOTE
PT EVALUATION 11:42-11:56    59 y o     [id number]    Stroke ([facility] [address] ) [I63 9]  Abnormal AFP3 test [R77 2]  Hand dysfunction [R29 898]    Past Medical History:   Diagnosis Date    Diverticulosis     Hypertension     Mononucleosis     Prediabetes          History reviewed  No pertinent surgical history  03/30/22 1142   PT Last Visit   PT Visit Date 03/30/22   Note Type   Note type Evaluation   Pain Assessment   Pain Score No Pain   Restrictions/Precautions   Weight Bearing Precautions Per Order No   Other Precautions Telemetry   Home Living   Type of [address] Two level;Bed/bath upstairs  (full bath both levels  4 CHIP)   Bathroom Shower/Tub Tub/shower unit   Bathroom Toilet Standard   Bathroom Equipment   (no DME)   [address]   (no DME)   Additional Comments resides with wife in 2 story home  Ability to have first floor set up if needed  Driving  Prior Function   Level of [place] Independent with ADLs and functional mobility   Lives With Spouse   Receives Help From Family   ADL Assistance Independent   IADLs Independent   Falls in the last 6 months 0   Vocational Full time employment   Comments I PTA  No endurance limitations previous  Active  General   Additional Pertinent History Pt is 60 y/o male admitted with CVA on outpatient MRI  Family/Caregiver Present No   Cognition   Overall Cognitive Status WFL   Arousal/Participation Alert   Orientation Level Oriented X4   Following Commands Follows all commands and directions without difficulty   Comments Pleasant  Subjective   Subjective Feels better  Just want cognitive to come back  Denies physical limitations      RUE Assessment   RUE Assessment WFL  (grossly 4/5)   LUE Assessment   LUE Assessment WFL  (grossly 4/5)   RLE Assessment   RLE Assessment WFL  (groslsy 4+/5)   LLE Assessment   LLE Assessment WFL  (groslsy 4+/5)   Vision-Basic Assessment   Current Vision Wears glasses all the time   Coordination   Movements are Fluid and Coordinated 1   Sensation WFL   Finger to Nose & Finger to Finger  Intact  (mildly slowed on L)   Heel to Shin Intact   Light Touch   RLE Light Touch Grossly intact   LLE Light Touch Grossly intact   Proprioception   RLE Proprioception Grossly intact   LLE Proprioception Grossly Intact   Bed Mobility   Supine to Sit 7  Independent   Sit to Supine 7  Independent   Additional Comments Dons sneakers and ties while seated EOB  Transfers   Sit to Stand 7  Independent   Stand to Sit 7  Independent   Additional Comments fluent transitions  Ambulation/Elevation   Gait pattern WNL  (quick, fluent pace)   Gait Assistance 7  Independent   Assistive Device   (none)   Distance 100 ft x 1   Stair Management Assistance Not tested   Balance   Static Sitting Normal   Dynamic Sitting Normal   Static Standing Normal   Dynamic Standing Good   Ambulatory Good   Endurance Deficit   Endurance Deficit No   Activity Tolerance   Activity Tolerance Patient tolerated treatment well   Medical Staff Made Aware NurseJanine   Nurse Made Aware yes   Assessment   Prognosis Good   Problem List Decreased coordination  (mildly slowed compared to R hand on L  )   Assessment Nikko Alex is a 59 y o  right handed male with hypertension, prediabetes, history of DVT previously on Xarelto, history of diverticulitis, presents with stroke found on outpatient MRI brain  Noted to have difficulty typing, difficulty concentrating, cognitive difficulty during meetings with work, + fatigue  Admitted with CVA on MRI: recent infarct right centrum semiovale extending into the right lentiform nucleus measures 2 9 x 1 6 cm  Planned PEGGY, on tele, neuro checks  Goal normotension with CVA likely occurring 3/24  PT consulted  Up and OOB as tolerated orders  Prior to admission independent without AD use  No endurance limitations  Working full time as    NO falls, no use of DME    Upon examination of strength, balance, activity tolerance, functional mobility, sensation, coordination and locomotion all WFL  R handed  Mild slowness in comparison to RUE with finger to nose, digit opposition, however + demonstrates accuracy with same  Independent with all aspects of mobility  No LOB with ambulation  Gait steady and fluent with increased speed  /82  The patient's AM-PAC Basic Mobility Inpatient Short Form Raw Score is 24  A Raw score of greater than 16 suggests the patient may benefit from discharge to home  Please also refer to the recommendation of the Physical Therapist for safe discharge planning  At present no skilled PT needs apparent  Will d/c PT  Please advise should needs change  Goals   Patient Goals for full recovery   Plan   Treatment/Interventions Functional transfer training;Patient/family training;Bed mobility;Gait training;Spoke to nursing   PT Frequency Other (Comment)  (d/c PT)   Recommendation   PT Discharge Recommendation No rehabilitation needs  (from PT perspective  )   AM-PAC Basic Mobility Inpatient   Turning in Bed Without Bedrails 4   Lying on Back to Sitting on Edge of Flat Bed 4   Moving Bed to Chair 4   Standing Up From Chair 4   Walk in Room 4   Climb 3-5 Stairs 4   Basic Mobility Inpatient Raw Score 24   Basic Mobility Standardized Score 57 68   Highest Level Of Mobility   JH-HLM Goal 8: Walk 250 feet or more   JH-HLM Highest Level of Mobility 7: Walk 25 feet or more   JH-HLM Goal Achieved No   Modified Ray Scale   Modified Benedict Scale 2   End of Consult   Patient Position at End of Consult Supine; All needs within reach     Hx/personal factors: co-morbidities, inaccessible home and telemetry, + CVA  Examination: assessed body system, balance, endurance, amb, D/C disposition & fall risk  Modified Benedict 2, AM PAC 24  Exam of strength, balance, activity tolerance, functional mobility, locomotion WFL  Mild slowing with finger to nose, opposition but [facility]     Clinical: unpredictable (ongoing medical status and imaging test/result pending), tele continuous monitoring  Ongoing workup with + CVA  Neuro checks ongoing , PEGGY pending     Complexity: high      Cristina Tang, PT

## 2022-03-30 NOTE — PERIOPERATIVE NURSING NOTE
Pt received into gi recovery bay 5  Vss, denies pain  1515-pt VSS  Remains pain free  IVF d/c'd  Placed back on tele monitor  Cleared by anesthesia  Report called to Javi garcia RN  Transported back to room 450-01

## 2022-03-30 NOTE — SPEECH THERAPY NOTE
Order received as part of stroke pathway, and chart reviewed  Discussed with RN  Pt passed nursing dysphagia screen, but is reporting some dysphagia issues  He is currently NPO for PEGGY later today; nursing has not received a time for the test  ST to follow up for assessment as able, when pt is able to resume PO, later today or tomorrow

## 2022-03-30 NOTE — PROGRESS NOTES
Progress Note - Neurology   Obed Parnell 59 y o  male MRN: [id number]  Unit/Bed#: E4 -01 Encounter: [id number]      Assessment/Plan   * Stroke  Assessment & Plan  78-year-old male with hypertension, prediabetes, history of DVT previously on Xarelto, history of diverticulosis with rectal bleeding, presents with stroke found on outpatient MRI brain  -MRI demonstrated acute stroke in the right centrum semiovale extending into the right lentiform nucleus  There was minimal chronic small vessel cerebrovascular ischemic disease  -CTA head/neck was performed in the ED which demonstrated some moderate right LAZARO stenosis but was otherwise unremarkable  There was no LVO  -Blood pressure on presentation was 137/89  -  A1c 6 5   -Lower extremity Dopplers negative for DVTs bilaterally  Patient stopped taking Xarelto on 12/25/2021 after being on this medication for at least 1 year, patient reports for DVT  States he did have thrombosis panel, which was negative, and DVTs were attributed to frequent flights for work  No family history of blood clots but father had significant stroke around same age as patient  Though location of stroke is typical for small vessel etiology, moderately large size of stroke and lack of significant chronic small vessel cerebrovascular ischemic disease or significant amount of intra/extracranial atherosclerosis raises suspicion of proximal embolic etiology  Plan:  -will give aspirin 325 mg x 1 and continue aspirin 81 mg daily for now  -start atorvastatin 80 mg daily  -PEGGY to be performed  If unrevealing, will likely recommend loop recorder implantation   -thrombosis panel ordered   -goal normotension as stroke likely occurred on 03/24/2022   -telemetry  -PT/OT/ST and stroke education  History of DVT (deep vein thrombosis)  Assessment & Plan  Patient reports history of DVTs, previously on Xarelto as noted above    Xarelto stopped by patient on 12/25/2021 due to rectal bleeding related to patient's diverticulosis  Patient reports normal thrombosis panel in the past and DVTs were attributed to frequent flights for work   -thrombosis panel pending   -Doppler (-) for DVTs  Hypertension  Assessment & Plan  Per patient, recently started on antihypertensive  Goal normotension as stroke likely occurred on 03/24/2022  Antwon Simons will need follow up in in 6 weeks with neurovascular attending  He will not require outpatient neurological testing        Subjective:   Neurology follow up  No new complaints, no neurological complaints at this time  No reported events overnight  Awaiting PEGGY      ROS:  12 point ROS are negative  Vitals: Blood pressure 123/77, pulse 63, temperature 97 5 °F (36 4 °C), temperature source Temporal, resp  rate 18, height 5' 6" (1 676 m), weight 59 9 kg (132 lb 0 9 oz), SpO2 95 %  ,Body mass index is 21 31 kg/m²  Current Facility-Administered Medications   Medication Dose Route Frequency Provider Last Rate    acetaminophen  650 mg Oral Q6H PRN Tampico Brush, PA-C      amLODIPine  5 mg Oral Daily Danielle Frankel PA-C      aspirin  81 mg Oral Daily Danielle Frankel, PA-C      atorvastatin  80 mg Oral Daily With Dinner Danielle Frankel PA-C      enoxaparin  40 mg Subcutaneous Daily Tampico Brush PA-C         Physical Exam:   Vital signs reviewed  Physical Exam   General:  Patient is well-developed, well-nourished, and in no acute distress  Lying in bed, cooperative  HEENT:  Head normocephalic  Eyes anicteric  Cardiovascular:  With regular rhythm  Lungs:  Normal effort  Nonlabored breathing  No wheezing  Extremities:  With no significant edema, rom intact in the uppers and lowers  Skin: No rashes      Neurologic: Again, the  patient is alert, pleasantly interactive, and appropriately conversational   No obvious symbolic language difficulty or dysarthria, and the patient is fully oriented    Correctly identified the current president  Oriented x 3  Able to read and repeat          Cranial Nerves:   II: Visual fields full to confrontation  Pupils equal, round, reactive to light with normal accomodation  III,IV,VI: extraocular movements intact with no nystagmus  V: Sensation in the V1 through V3 distributions intact to light touch bilaterally  VII:  Slightly smaller left palpebral fissure  Again, subtle left nasolabial flattening/less spontaneous movement of the left lower face but symmetrical smile  VIII: Audition intact to finger rub bilaterally  IX/X: Uvula midline  Soft palate elevation symmetric  XII: Tongue midline with no atrophy or fasciculations with appropriate movement       Coordination:  Accurate with finger-to-nose and heel-to-shin maneuvers bilaterally      Motor testing with no upper or lower extremity drift  Full strength throughout the 4 extremities      Sensory testing grossly intact to light touch and temperature throughout        Toe responses are downgoing bilaterally  No seizure activity observed       Lab, Imaging and other studies:   I have personally reviewed pertinent reports    , CBC:   Results from last 7 days   Lab Units 03/30/22  0532 03/29/22  0746   WBC Thousand/uL 6 56 6 80   RBC Million/uL 4 67 4 86   HEMOGLOBIN g/dL 14 7 15 3   HEMATOCRIT % 43 4 44 6   MCV fL 93 92   PLATELETS Thousands/uL 210 231   , BMP/CMP:   Results from last 7 days   Lab Units 03/30/22  0532 03/29/22  0746   SODIUM mmol/L 139 141   POTASSIUM mmol/L 3 8 4 0   CHLORIDE mmol/L 105 105   CO2 mmol/L 28 30   BUN mg/dL 15 11   CREATININE mg/dL 0 80 0 98   CALCIUM mg/dL 8 8 8 6   AST U/L  --  15   ALT U/L  --  23   ALK PHOS U/L  --  90   EGFR ml/min/1 73sq m 97 84   , Vitamin B12:   , HgBA1C:   Results from last 7 days   Lab Units 03/29/22  0746   HEMOGLOBIN A1C % 6 5*   , TSH:   Results from last 7 days   Lab Units 03/29/22  1026   TSH 3RD GENERATON uIU/mL 0 915   , Coagulation:   Results from last 7 days   Lab Units 03/29/22  0746   INR  1 02   , Lipid Profile:   Results from last 7 days   Lab Units 03/29/22  0746   HDL mg/dL 51   LDL CALC mg/dL 136*   TRIGLYCERIDES mg/dL 129   , Ammonia:   , Urinalysis:       Invalid input(s): URIBILINOGEN, Drug Screen:   , Medication Drug Levels:       Invalid input(s): CARBAMAZEPINE,  PHENOBARB, LACOSAMIDE, OXCARBAZEPINE     Thrombosis panel in process    Per radiology interpretation -    "  Procedure: CTA head and neck with and without contrast    Result Date: 3/29/2022  Narrative: CTA NECK AND BRAIN WITH AND WITHOUT CONTRAST INDICATION: Recent acute stroke on Thursday found on MRI today, typing difficulty COMPARISON:   MRI of the brain from March 29, 2022  TECHNIQUE:  Routine CT imaging of the Brain without contrast   Post contrast imaging was performed after administration of iodinated contrast through the neck and brain  Post contrast axial 0 625 mm images timed to opacify the arterial system  3D rendering was performed on an independent workstation  MIP reconstructions performed  Coronal reconstructions were performed of the noncontrast portion of the brain  Radiation dose length product (DLP) for this visit:  (91 2346-8309 mGy-cm   This examination, like all CT scans performed in the [facility], was performed utilizing techniques to minimize radiation dose exposure, including the use of iterative reconstruction and automated exposure control  IV Contrast:  85 mL of iohexol (OMNIPAQUE) there was significant streak artifact from the contrast bolus within the left brachiocephalic vein  The timing of the contrast bolus was suboptimal but the opacification of the arterial vasculature is diagnostic  IMAGE QUALITY:   Diagnostic FINDINGS: NONCONTRAST BRAIN PARENCHYMA:  Subacute infarction in the right deep cerebral gray matter involving the putamen, right corona radiata and caudate  No associated parenchymal hemorrhage    No significant mass effect on the right ventricular system  VENTRICLES AND EXTRA-AXIAL SPACES:  Slight asymmetry in the size of the lateral ventricles  No midline shift or herniation  Basal cisterns remain patent  VISUALIZED ORBITS AND PARANASAL SINUSES:  Mild inflammatory mucosal disease in the maxillary sinuses  Orbits are unremarkable  CERVICAL VASCULATURE AORTIC ARCH AND GREAT VESSELS:  Normal aortic arch and great vessel origins  Normal visualized subclavian vessels  RIGHT VERTEBRAL ARTERY CERVICAL SEGMENT:  Normal origin  The vessel is normal in caliber throughout the neck  LEFT VERTEBRAL ARTERY CERVICAL SEGMENT:  Normal origin  The vessel is normal in caliber throughout the neck  RIGHT EXTRACRANIAL CAROTID SEGMENT:  Normal caliber common carotid artery  Normal bifurcation and cervical internal carotid artery  No stenosis or dissection  LEFT EXTRACRANIAL CAROTID SEGMENT:  Normal caliber common carotid artery  Normal bifurcation and cervical internal carotid artery  No stenosis or dissection  NASCET criteria was used to determine the degree of internal carotid artery diameter stenosis  INTRACRANIAL VASCULATURE INTERNAL CAROTID ARTERIES:  Normal enhancement of the intracranial portions of the internal carotid arteries  Normal ophthalmic artery origins  Normal ICA terminus  ANTERIOR CIRCULATION:  Moderate stenosis within the proximal right horizontal LAZARO (4, 79 through 81)  This is less apparent on the MIP images  The left horizontal and both vertical ACAs remain patent  MIDDLE CEREBRAL ARTERY CIRCULATION:  M1 segment and middle cerebral artery branches demonstrate normal enhancement bilaterally  DISTAL VERTEBRAL ARTERIES:  Normal distal vertebral arteries  Posterior inferior cerebellar artery origins are normal  Normal vertebral basilar junction  BASILAR ARTERY:  Basilar artery is normal in caliber  Normal superior cerebellar arteries  POSTERIOR CEREBRAL ARTERIES: Both posterior cerebral arteries arises from the basilar tip    Both arteries demonstrate normal enhancement  VENOUS STRUCTURES:  Not adequately opacified  NON VASCULAR ANATOMY BONY STRUCTURES:  No acute osseous abnormality  SOFT TISSUES OF THE NECK:  Normal  THORACIC INLET:  Unremarkable  Impression: Limited but diagnostic evaluation due to timing of the contrast bolus  Stable nonhemorrhagic subacute infarction in the right basal ganglia/lentiform nucleus with suggestion of moderate stenosis in the ipsilateral horizontal LAZARO  No large vessel occlusion  I personally discussed this study with [address] [place] [address] on 3/29/2022 at 9:28 AM  Workstation performed: [id number]     Procedure: MRI brain wo contrast    Result Date: 3/29/2022  Narrative: MRI BRAIN WITHOUT CONTRAST INDICATION: G45 9: Transient cerebral ischemic attack, unspecified  COMPARISON:   None  TECHNIQUE:  Sagittal T1, axial T2, axial FLAIR, axial T1, axial Gradient and axial diffusion imaging  IMAGE QUALITY:  Diagnostic  FINDINGS: BRAIN PARENCHYMA:  There is a recent infarct in the right centrum semiovale measuring 2 9 x 1 6 cm which extends into the right lentiform nucleus  No hemorrhage identified  Small scattered hyperintensities on T2/FLAIR imaging are noted in the periventricular and subcortical white matter demonstrating an appearance that is statistically most likely to represent mild microangiopathic change  Prominent perivascular space noted in the right inferior basal ganglia adjacent to the anterior commissure  VENTRICLES:  Normal for the patient's age  SELLA AND PITUITARY GLAND:  Normal  ORBITS:  Normal  PARANASAL SINUSES:  Small mucus retention cysts are seen in the bilateral maxillary sinus  VASCULATURE:  Evaluation of the major intracranial vasculature demonstrates appropriate flow voids  CALVARIUM AND SKULL BASE:  Normal  EXTRACRANIAL SOFT TISSUES:  Normal      Impression: Recent infarct right centrum semiovale extending into the right lentiform nucleus measures 2 9 x 1 6 cm  No hemorrhage identified    I personally discussed this study with Dr Michelle Aceves in the emergency department on 3/29/2022 at 7:10 AM   The patient's care will be transferred to the emergency department  Workstation performed: [id number]     Procedure: VAS lower limb venous duplex study, complete bilateral    Result Date: 3/29/2022  Narrative:  [facility] REPORT CLINICAL: Indications: Physician wants to determine patency of the venous system secondary to history of deep vein thrombosis  He is not currently on a blood thinner  He denies any pain or concerns with his legs at this time  Operative History: Patient denies any cardiovascular procedures  Risk Factors The patient has history of HTN, CVA and DVT  CONCLUSION:  Impression: RIGHT LOWER LIMB: No evidence of acute or chronic deep vein thrombosis  LEFT LOWER LIMB: No evidence of acute or chronic deep vein thrombosis  Technical findings were [facility] via [employer] at 11:25 AM on 3/29/22  SIGNATURE: Electronically Signed by: Lakisha Quesada on 2022-03-29 01:00:53 PM  "    Counseling / Coordination of Care  Reviewed with attending plan of care, plan of care per attending physician, please see attestation for details

## 2022-03-30 NOTE — DISCHARGE SUMMARY
Discharge Summary - Eleanor He, 1965, [id number]        Admission Date: 3/29/2022  Discharge Date:  3/30/2022    Discharge Diagnosis:   1  Right hemispheric stroke  2  Hyperlipidemia  3  Prediabetes   4  Hypertension  5  History of DVT    Consulting Physicians:  Dr Armen Alfred, neurology    Procedures Performed:   Transesophageal echocardiography    HPI:  The patient is a 57-year-old man who developed a feeling of confusion on March 24th  The symptoms seem to resolved but he felt fatigued  He saw his personal physician on March 28th  An MRI was requested  This was done yesterday and showed a stroke in the right centrum semiovale extending into the lentiform nucleus  The patient was admitted for expedited evaluation  Hospital Course: The patient was admitted to the hospital and monitored carefully on telemetry  He remained clinically stable  His heart rhythm was normal   He had a CTA of the head and neck which showed no large vessel occlusion or stenosis  He had a PEGGY which showed no evidence of embolic source  LDL cholesterol was 136  Hemoglobin A1c was 6 5  The patient was evaluated by Neurology  Antiplatelet therapy and risk factor modification was recommended  Arrangements for a loop recorder will be made  The patient was continued on amlodipine for his blood pressure  He was started on atorvastatin 80 mg daily for his cholesterol  He was given aspirin 81 mg daily for secondary stroke prophylaxis  He will remain on diet and exercise to manage his abnormal glucose metabolism  On the day of discharge the patient was feeling quite well  Vital signs were stable  Lungs were clear  Cardiac exam revealed regular rhythm  The abdomen was soft and nontender  There was no edema  Disposition:  The patient was discharged home on March 30th  He will be on a regular diet  Activity will be as tolerated    He was asked to arrange follow-up with his personal physician, Dr Elizabeth Wilburn, within 1 week  He will also be re-evaluated by Neurology in 6 weeks  He will be contacted regarding placement of a loop recorder  Discharge instructions/Information to patient and family:   See after visit summary for information provided to patient and family  Provisions for Follow-Up Care:  See after visit summary for information related to follow-up care and any pertinent home health orders  Planned Readmission: No    Discharge Statement   I spent 30 minutes discharging the patient  This time was spent on the day of discharge  I had direct contact with the patient on the day of discharge  Discharge Medications:  See after visit summary for reconciled discharge medications provided to patient and family

## 2022-03-30 NOTE — UTILIZATION REVIEW
Initial Clinical Review    Admission: Date/Time/Statement:   Admission Orders (From admission, onward)     Ordered        03/29/22 1122  Place in Observation  Once                      Orders Placed This Encounter   Procedures    Place in Observation     Standing Status:   Standing     Number of Occurrences:   1     Order Specific Question:   Level of Care     Answer:   Med Surg [16]     ED Arrival Information     Expected Arrival Acuity    - 3/29/2022 07:24 Emergent         Means of arrival Escorted by Service Admission type    [facility] Emergency         Arrival complaint    abnormal labs        Chief Complaint   Patient presents with    Abnormal Lab     Pt reports being sent here from MRI with abnormal findings  Thursday started with confusion, weakness over the weekend  has since resolved  Initial Presentation: 59 y o  male W/PMHX:HTN recently started on antihypertesive, prediabetes diet controlled, , h/o DVT D/T frequent flight for work d/c 12/25/21 after a year, diverticulosis w/rectal bleed to ED from [address] [place] [address], admitted as OBSERVATION due to Stroke, likely occurred 3/24/22  While at work, developed some confusion when he was typing  With persistent mental fogginess, throughout the day into Friday, seen by pcp, on Monday, over weekend was fatigued, pcp ordered mri of brain  Presented with stroke found on outpt MRI of brain,   Exam: c/O mental fogginess, and some mild swallowing diffuculty  ED work up reveals: acute stroke, in the rt centrum semiovales, extending into the rt  Lentiform nucleus  CTA H/H demonstrated mod rt ICA stenosis  No  DVT on B/L lower extremity Doppler,    Plan: asa 325mg x1 and 81mg od, statin, cardiology to do PEGGY, and possible loop recorder, thrombosis panel pending, goal normotensive, telm, ot/pt,st      STROKE ALERT: Mental fogginess, and some mild swallowing difficulty, but neurologic, exam objectively normal except subtle left facial asymmetry    Plan asa load and follow daily asa, statin, follow cardiology for PEGGY and possible loop recorder  Date: 3/30/22: Discharge from observation: Rt  A hemispheric stroke, HLD, prediabetes, HTD, h/o DVT, PEGGY no evidence of embolic source  On the day of discharge the patient was feeling quite well  Vital signs were stable  Lungs were clear  Cardiac exam revealed regular rhythm      Disposition:  The patient was discharged home on March 30th  He will be on a regular diet  Activity will be as tolerated  He was asked to arrange follow-up with his personal physician, Dr Albania Ruiz, within 1 week  He will also be re-evaluated by Neurology in 6 weeks  He will be contacted regarding placement of a loop recorder  Neurology note: Location of stroke is typical for small vessel etiology, moderately large size of stroke and lack of significant chronic small vessel cerebrovascular ischemic disease or significant amount of intra/extracranial atherosclerosis raises suspicion of proximal embolic etiology  Pending PEGGY, neuro exam unremarkable, Subtle left nasolabial flattening/less spontaneous movement of the left lower face but symmetrical smile       ED Triage Vitals   Temperature Pulse Respirations Blood Pressure SpO2   03/29/22 0740 03/29/22 0731 03/29/22 0731 03/29/22 0731 03/29/22 0731   98 °F (36 7 °C) 69 19 137/89 98 %      Temp Source Heart Rate Source Patient Position - Orthostatic VS BP Location FiO2 (%)   03/29/22 0740 03/29/22 0731 03/29/22 0731 03/29/22 0731 --   Oral Monitor Sitting Right arm       Pain Score       03/29/22 0731       No Pain          Wt Readings from Last 1 Encounters:   03/29/22 59 9 kg (132 lb 0 9 oz)     Additional Vital Signs:  Time Temp Pulse Resp BP MAP (mmHg) SpO2 O2 Device Patient Position - Orthostatic VS   03/30/22 1140 97 7 °F (36 5 °C) 75 18 131/82 102 95 % None (Room air) Lying   03/30/22 0728 97 5 °F (36 4 °C) 63 18 123/77 95 95 % None (Room air) Lying   03/30/22 0300 97 3 °F (36 3 °C) Abnormal  67 18 115/71 88 96 % None (Room air) Lying   03/30/22 0100 -- 65 -- 114/78 90 -- None (Room air) Lying   03/29/22 2300 97 1 °F (36 2 °C) Abnormal  76 18 118/75 91 97 % None (Room air) Lying   03/29/22 2100 -- 79 -- 131/82 100 -- -- Lying   03/29/22 1900 98 4 °F (36 9 °C) 70 18 120/76 92 95 % None (Room air) Lying   03/29/22 1700 -- 69 -- 122/77 -- 95 % -- --   03/29/22 1600 -- 84 -- 133/77 -- 96 % -- --   03/29/22 1500 -- 92 18 128/76 95 95 % None (Room air) Lying   03/29/22 1400 -- 77 18 132/75 -- 96 % -- Lying   03/29/22 1337 -- 79 18 150/94 -- -- -- Lying   03/29/22 1222 -- 64 21 124/85 99 97 % None (Room air) Sitting   03/29/22 1122 -- 70 22 132/81 102 96 % None (Room air) Lying   03/29/22 0936 -- 68 20 133/88 -- 97 % None (Room air) Lying   03/29/22 0755 -- -- -- -- -- -- None (Room air) --   03/29/22 0740 98 °F (36 7 °C) -- -- -- -- -- -- --     Date and Time R Pupil Size (mm) L Pupil Size (mm) R Pupil Reaction L Pupil Reaction   03/29/22 2300 2 2 Brisk Brisk   03/29/22 2100 2 2 Brisk Brisk   03/29/22 1900 2 2 Brisk Brisk   03/29/22 1700 2 2 Brisk Brisk   03/29/22 1600 2 2 Brisk Brisk   03/29/22 1500 2 2 Brisk Brisk   03/29/22 1400 2 2 Brisk Brisk   03/29/22 1300 2 2 Brisk Brisk   03/29/22 1200 2 2 Brisk Brisk   03/29/22 1100 2 2 Brisk Brisk   03/29/22 0753 2 2 Brisk Brisk   Carver Coma Scale    Date and Time Eye Opening Best Verbal Response Best Motor Response Isrrael Coma Scale Score   03/29/22 2300 4 5 6 15   03/29/22 2100 4 5 6 15   03/29/22 1900 4 5 6 15   03/29/22 1700 4 5 6 15   03/29/22 1600 4 5 6 15   03/29/22 1500 4 5 6 15   03/29/22 1400 4 5 6 15   03/29/22 1300 4 5 6 15   03/29/22 1200 4 5 6 15   03/29/22 1100 4 5 6 15   03/29/22 0753 4 5 6 15       Pertinent Labs/Diagnostic Test Results:   3/30/22 PEGGY no evidence of embolic source     3/29/22 EKG: Normal sinus rhythm  Possible Septal infarct , age undetermined  Abnormal ECG  When compared with ECG of 09-AUG-2001  [facility] lower limb venous duplex study, complete bilateral   Final Result by Addis Coon MD (03/29 1300)      CTA head and neck with and without contrast   Final Result by Coleen Ricks MD (03/29 0168)      Limited but diagnostic evaluation due to timing of the contrast bolus  Stable nonhemorrhagic subacute infarction in the right basal ganglia/lentiform nucleus with suggestion of moderate stenosis in the ipsilateral horizontal LAZARO  No large vessel occlusion         I personally discussed this study with [address] [place] [address] on 3/29/2022 at 9:28 AM                      Workstation performed: [id number]               Results from last 7 days   Lab Units 03/30/22  0532 03/29/22  0746   WBC Thousand/uL 6 56 6 80   HEMOGLOBIN g/dL 14 7 15 3   HEMATOCRIT % 43 4 44 6   PLATELETS Thousands/uL 210 231   NEUTROS ABS Thousands/µL 3 65 3 89         Results from last 7 days   Lab Units 03/30/22  0532 03/29/22  0746   SODIUM mmol/L 139 141   POTASSIUM mmol/L 3 8 4 0   CHLORIDE mmol/L 105 105   CO2 mmol/L 28 30   ANION GAP mmol/L 6 6   BUN mg/dL 15 11   CREATININE mg/dL 0 80 0 98   EGFR ml/min/1 73sq m 97 84   CALCIUM mg/dL 8 8 8 6     Results from last 7 days   Lab Units 03/29/22  0746   AST U/L 15   ALT U/L 23   ALK PHOS U/L 90   TOTAL PROTEIN g/dL 7 4   ALBUMIN g/dL 3 7   TOTAL BILIRUBIN mg/dL 0 40         Results from last 7 days   Lab Units 03/30/22  0532 03/29/22  0746   GLUCOSE RANDOM mg/dL 143* 140         Results from last 7 days   Lab Units 03/29/22  0746   HEMOGLOBIN A1C % 6 5*   EAG mg/dl 140           Results from last 7 days   Lab Units 03/29/22  0746   PROTIME seconds 13 2   INR  1 02   PTT seconds 25     Results from last 7 days   Lab Units 03/29/22  1026   TSH 3RD GENERATON uIU/mL 0 915     ED Treatment:   Medication Administration from 03/29/2022 0724 to 03/29/2022 1349       Date/Time Order Dose Route Action     03/29/2022 0855 iohexol (OMNIPAQUE) 350 MG/ML injection (SINGLE-DOSE) 85 mL 85 mL Intravenous Given 03/29/2022 1021 aspirin tablet 325 mg 325 mg Oral Given        Past Medical History:   Diagnosis Date    Diverticulosis     Hypertension     Mononucleosis     Prediabetes        Admitting Diagnosis: Stroke ([facility] [address] ) [I63 9]  Abnormal AFP3 test [R77 2]  Hand dysfunction [R29 898]  Age/Sex: 59 y o  male  Admission Orders: VS Q8, neuro cks, telm, baseline and d/c NIH score, I/S, scd,   Scheduled Medications:  amLODIPine, 5 mg, Oral, Daily  aspirin, 81 mg, Oral, Daily  atorvastatin, 80 mg, Oral, Daily With Dinner  enoxaparin, 40 mg, Subcutaneous, Daily      Continuous IV Infusions:none     PRN Meds:  acetaminophen, 650 mg, Oral, Q6H PRN        IP CONSULT TO NEUROLOGY  IP CONSULT TO CARDIOLOGY  IP CONSULT TO CASE MANAGEMENT  IP CONSULT TO NUTRITION SERVICES    Network Utilization Review Department  ATTENTION: Please call with any questions or concerns to [phone] and carefully listen to the prompts so that you are directed to the right person  All voicemails are confidential   [employer] all requests for admission clinical reviews, approved or denied determinations and any other requests to dedicated fax number below belonging to the campus where the patient is receiving treatment   List of dedicated fax numbers for the Facilities:  [address] DENIALS (Administrative/Medical Necessity) [phone]   [address] (Maternity/NICU/Pediatrics) [phone]   [address]  [address] [facility] [place] [address] [address] [place] [address] [address] [phone] [place] [address] [phone]

## 2022-03-30 NOTE — PLAN OF CARE
Problem: Potential for Falls  Goal: Patient will remain free of falls  Description: INTERVENTIONS:  - Educate patient/family on patient safety including physical limitations  - Instruct patient to call for assistance with activity   - Consult OT/PT to assist with strengthening/mobility   - Keep Call bell within reach  - Keep bed low and locked with side rails adjusted as appropriate  - Keep care items and personal belongings within reach  - Initiate and maintain comfort rounds  - Make Fall Risk Sign visible to staff  - Offer Toileting every  Hours, in advance of need  - Initiate/Maintain alarm  - Obtain necessary fall risk management equipment  - Apply yellow socks and bracelet for high fall risk patients  - Consider moving patient to room near nurses station  Outcome: Progressing     Problem: Neurological Deficit  Goal: Neurological status is stable or improving  Description: Interventions:  - Monitor and assess patient's level of consciousness, motor function, sensory function, and level of assistance needed for ADLs  - Monitor and report changes from baseline  Collaborate with interdisciplinary team to initiate plan and implement interventions as ordered  - Provide and maintain a safe environment  - Consider seizure precautions  - Consider fall precautions  - Consider aspiration precautions  - Consider bleeding precautions  Outcome: Progressing     Problem: Activity Intolerance/Impaired Mobility  Goal: Mobility/activity is maintained at optimum level for patient  Description: Interventions:  - Assess and monitor patient  barriers to mobility and need for assistive/adaptive devices  - Assess patient's emotional response to limitations  - Collaborate with interdisciplinary team and initiate plans and interventions as ordered  - Encourage independent activity per ability   - Maintain proper body alignment  - Perform active/passive rom as tolerated/ordered  - Plan activities to conserve energy    - Turn patient as appropriate  Outcome: Progressing     Problem: Communication Impairment  Goal: Ability to express needs and understand communication  Description: Assess patient's communication skills and ability to understand information  Patient will demonstrate use of effective communication techniques, alternative methods of communication and understanding even if not able to speak  - Encourage communication and provide alternate methods of communication as needed  - Collaborate with case management/ for discharge needs  - Include patient/family/caregiver in decisions related to communication  Outcome: Progressing     Problem: Potential for Aspiration  Goal: Non-ventilated patient's risk of aspiration is minimized  Description: Assess and monitor vital signs, respiratory status, and labs (WBC)  Monitor for signs of aspiration (tachypnea, cough, rales, wheezing, cyanosis, fever)  - Assess and monitor patient's ability to swallow  - Place patient up in chair to eat if possible  - HOB up at 90 degrees to eat if unable to get patient up into chair   - Supervise patient during oral intake  - Instruct patient/ family to take small bites  - Instruct patient/ family to take small single sips when taking liquids  - Follow patient-specific strategies generated by speech pathologist   Outcome: Progressing  Goal: Ventilated patient's risk of aspiration is minimized  Description: Assess and monitor vital signs, respiratory status, airway cuff pressure, and labs (WBC)  Monitor for signs of aspiration (tachypnea, cough, rales, wheezing, cyanosis, fever)  - Elevate head of bed 30 degrees if patient has tube feeding   - Monitor tube feeding    Outcome: Progressing     Problem: Nutrition  Goal: Nutrition/Hydration status is improving  Description: Monitor and assess patient's nutrition/hydration status for malnutrition (ex- brittle hair, bruises, dry skin, pale skin and conjunctiva, muscle wasting, smooth red tongue, and disorientation)  Collaborate with interdisciplinary team and initiate plan and interventions as ordered  Monitor patient's weight and dietary intake as ordered or per policy  Utilize nutrition screening tool and intervene per policy  Determine patient's food preferences and provide high-protein, high-caloric foods as appropriate  - Assist patient with eating   - Allow adequate time for meals   - Encourage patient to take dietary supplement as ordered  - Collaborate with clinical nutritionist   - Include patient/family/caregiver in decisions related to nutrition    Outcome: Progressing     Problem: PAIN - ADULT  Goal: Verbalizes/displays adequate comfort level or baseline comfort level  Description: Interventions:  - Encourage patient to monitor pain and request assistance  - Assess pain using appropriate pain scale  - Administer analgesics based on type and severity of pain and evaluate response  - Implement non-pharmacological measures as appropriate and evaluate response  - Consider cultural and social influences on pain and pain management  - Notify physician/advanced practitioner if interventions unsuccessful or patient reports new pain  Outcome: Progressing     Problem: INFECTION - ADULT  Goal: Absence or prevention of progression during hospitalization  Description: INTERVENTIONS:  - Assess and monitor for signs and symptoms of infection  - Monitor lab/diagnostic results  - Monitor all insertion sites, i e  indwelling lines, tubes, and drains  - Monitor endotracheal if appropriate and nasal secretions for changes in amount and color  - [place] appropriate cooling/warming therapies per order  - Administer medications as ordered  - Instruct and encourage patient and family to use good hand hygiene technique  - Identify and instruct in appropriate isolation precautions for identified infection/condition  Outcome: Progressing  Goal: Absence of fever/infection during neutropenic period  Description: INTERVENTIONS:  - Monitor WBC    Outcome: Progressing     Problem: DISCHARGE PLANNING  Goal: Discharge to home or other facility with appropriate resources  Description: INTERVENTIONS:  - Identify barriers to discharge w/patient and caregiver  - Arrange for needed discharge resources and transportation as appropriate  - Identify discharge learning needs (meds, wound care, etc )  - Arrange for interpretive services to assist at discharge as needed  - Refer to Case Management Department for coordinating discharge planning if the patient needs post-hospital services based on physician/advanced practitioner order or complex needs related to functional status, cognitive ability, or social support system  Outcome: Progressing     Problem: Knowledge Deficit  Goal: Patient/family/caregiver demonstrates understanding of disease process, treatment plan, medications, and discharge instructions  Description: Complete learning assessment and assess knowledge base    Interventions:  - Provide teaching at level of understanding  - Provide teaching via preferred learning methods  Outcome: Progressing

## 2022-03-30 NOTE — SPEECH THERAPY NOTE
Order received as part of stroke pathway, chart reviewed, spoke with RN  Pt passed nursing dysphagia screen, but nurse reports pt is reporting mild dysphagia sx  Attempted to see pt x2 for swallow assessment, but pt was NPO for PEGGY this morning; checked in afternoon and pt is still gone at procedure  ST to see for assessment when pt able to resume PO diet, as able, in the next 24-48 hours

## 2022-03-30 NOTE — ANESTHESIA PREPROCEDURE EVALUATION
Procedure:  PEGGY    Relevant Problems   CARDIO   (+) Hyperlipidemia   (+) Hypertension      NEURO/PSYCH   (+) History of DVT (deep vein thrombosis)   (+) Stroke        Physical Exam    Airway    Mallampati score: II  TM Distance: >3 FB  Neck ROM: full     Dental   No notable dental hx     Cardiovascular  Cardiovascular exam normal    Pulmonary  Pulmonary exam normal     Other Findings        Anesthesia Plan  ASA Score- 3 Emergent    Anesthesia Type- general with ASA Monitors  Additional Monitors:   Airway Plan:           Plan Factors-Exercise tolerance (METS): >4 METS  Chart reviewed  Patient instructed to abstain from smoking on day of procedure  Patient did not smoke on day of surgery  Obstructive sleep apnea risk education given perioperatively  Induction- intravenous  Postoperative Plan-     Informed Consent- Anesthetic plan and risks discussed with patient

## 2022-03-30 NOTE — ANESTHESIA POSTPROCEDURE EVALUATION
Post-Op Assessment Note    CV Status:  Stable    Pain management: adequate     Mental Status:  Alert and awake   Hydration Status:  Euvolemic   PONV Controlled:  Controlled   Airway Patency:  Patent      Post Op Vitals Reviewed: Yes      Staff: Anesthesiologist         No complications documented      BP 98/66 (03/30/22 1459)    Temp      Pulse 79 (03/30/22 1459)   Resp 16 (03/30/22 1459)    SpO2 96 % (03/30/22 1459)

## 2022-03-31 LAB
PROT C AG ACT/NOR PPP IA: >150 % OF NORMAL (ref 60–150)
PROT S ACT/NOR PPP: 102 % (ref 71–117)
PROT S ACT/NOR PPP: 94 % (ref 61–136)
PROT S PPP-ACNC: 100 % (ref 60–150)

## 2022-04-01 ENCOUNTER — TELEPHONE (OUTPATIENT)
Dept: NEUROLOGY | Facility: CLINIC | Age: 57
End: 2022-04-01

## 2022-04-01 LAB
APTT SCREEN TO CONFIRM RATIO: 1.13 RATIO (ref 0–1.34)
CONFIRM APTT/NORMAL: 37.4 SEC (ref 0–47.6)
LA PPP-IMP: NORMAL
SCREEN APTT: 30.5 SEC (ref 0–51.9)
SCREEN DRVVT: 37.2 SEC (ref 0–47)
THROMBIN TIME: 16 SEC (ref 0–23)

## 2022-04-01 NOTE — TELEPHONE ENCOUNTER
Spoke with Pt sched HFU appt with Dr Arden Curling on 7/15/22 at 9:00 am  Pt added to the [facility]      HFU/SLAL/ATT/Stroke/DC3/30    Note from Chart:        Jessica Aramis will need follow up in in 6 weeks with neurovascular attending  He will not require outpatient neurological testing

## 2022-04-06 ENCOUNTER — TELEPHONE (OUTPATIENT)
Dept: NEUROLOGY | Facility: CLINIC | Age: 57
End: 2022-04-06

## 2022-04-06 LAB
F2 GENE MUT ANL BLD/T: NORMAL
F5 GENE MUT ANL BLD/T: NORMAL

## 2022-04-06 NOTE — TELEPHONE ENCOUNTER
Post CVA Discharge Follow Up    Hospitalization: 3/29/2022 - 3/30/2022   The purpose of this phone call is to assess patient's general wellbeing or for any assistance needed with follow-up care  Called, reached patient, I introduced myself and explained neurovascular nurse navigator role  Since discharge, he denies experiencing any new or worsening stroke-like symptoms  Patient reports he has some hiccupping when eating to fast and some fatigue, but otherwise feels he is improving  Ambulation / ADLs:  he is ambulating independently as well as preforming his own ADLs  Patient manages his own medications, appointments, and affairs  Appointments / Medication Review:  Reviewed appointments - patient successfully followed up with PCP  Patient scheduled with Dr Cedeño Hidden 7/15  He plans to reach out to cardiology for his loop recorder  I reviewed medications with him  There have not been any medication changes since discharge from the hospital  Reports having no difficulties obtaining medications  Reports he is taking all as prescribed with no missed doses, medication side effects, or signs of bleeding  Risk Factors / Education:  During this call, he reports he understanding of stroke type, symptoms, personal risk factors and management, medications, and resources  As for risk factors, patient has been competent with changes and reports he has his wife as well  Reported average BP at nighttime as been 140s/90s but reports it is more stable during the day and at recent PCP follow up visit  I addressed all his questions  At the conclusion of the conversation, patient denies having any further questions or concerns

## 2022-04-13 ENCOUNTER — TELEPHONE (OUTPATIENT)
Dept: CARDIOLOGY CLINIC | Facility: CLINIC | Age: 57
End: 2022-04-13

## 2022-04-13 NOTE — TELEPHONE ENCOUNTER
Called the patient to discuss whether he needed to be seen in the office versus being set up out right for loop recorder implantation  Patient has discussed with his PCP and feels comfortable with scheduling the procedure out rate  We did go over the procedure and restrictions afterwards  Will have our schedulers reach out to him to have this placed  Did also give him our office number in case he has any further questions in the meantime       ----- Message from Karon Mendez sent at 4/11/2022  3:49 PM EDT -----  Kerrie Hayes  This pt called general today and they sent him up to EP  He said he has a "referral" from his family dr Zeferino Blanco - but no referral in system  He said he had a stroke and was told he will need a loop recorder  Should this pt have an appt with EP or general or go directly to procedure scheduling?   Please advise  Thanks  Maggie Casillas

## 2022-04-14 ENCOUNTER — TELEPHONE (OUTPATIENT)
Dept: CARDIOLOGY CLINIC | Facility: CLINIC | Age: 57
End: 2022-04-14

## 2022-04-14 NOTE — TELEPHONE ENCOUNTER
Called pt in regards to his loop recorder implant procedure referred by Jefry Naidu  Patient aware we will contact him to schedule as soon as possible    Pt was a little upset about it  He will talk to his doctor about it

## 2022-04-14 NOTE — TELEPHONE ENCOUNTER
Pt called us today asking why he couldn't get an appointment for the loop monitor implant  I spoke to kezia and she said she will be working on getting him monitored  Patient is aware   Thank you kezia

## 2022-04-19 NOTE — TELEPHONE ENCOUNTER
I talked with the attendings concerning for this patient's loop recorder  To accommodate him getting his loop recorder, can we please set this up to be done in the morning on a day that Dr Nguyen Rizo is either rounding or doing procedures, [place]?   Can be done either this week or next week

## 2022-04-20 NOTE — TELEPHONE ENCOUNTER
Pt is scheduled for a loop impant on 4/27/2022 at [facility] with Dr Camargo    Pt is aware of the instructions (mychart)    No med holds    No allergies    Can I get an Auth?

## 2022-04-26 NOTE — TELEPHONE ENCOUNTER
Called sommer    Had to tell him we need to r/s due to his insurance still pending for tomorrows procedure  He was not happy at first  I explained to him that this can happen and that its not our end that is causing this  The only date available for loop implant would be 5/5 (that will allow enough time for the insurance to approve)  The pt agreed to have the procedure done on 5/5 with Dr Camargo

## 2022-05-03 NOTE — TELEPHONE ENCOUNTER
Peer to peer is set up for tomorrow between the times of 2-230  Kerrie will be speaking with Dr Elvis Bush  Let me know if I can do anything else to help!

## 2022-05-03 NOTE — TELEPHONE ENCOUNTER
Spoke to Citlaly Overton to him why his insurance denied the loop procedure  He was aggrevated he still wants the loop I did advised him that the doc needs to do a P2P to see if it gets approved that way  At this time his appt on 5/5 may not happen if this doesn't get approved by the P2P

## 2022-05-03 NOTE — TELEPHONE ENCOUNTER
Spoke w insurance to set up peer to peer  I was not able to set it up before confirming with the provider that we will not be doing a 14 day zio  After confirming with Kerrie, I attempted to call back  No answer  LMOV   I will try again at 2pm (As it looks like that is when they reopen)

## 2022-05-04 ENCOUNTER — TELEPHONE (OUTPATIENT)
Dept: CARDIOLOGY CLINIC | Facility: CLINIC | Age: 57
End: 2022-05-04

## 2022-05-04 NOTE — TELEPHONE ENCOUNTER
Spoke to Nafisa Zafar about his loop implant  Based off the P2P, The insurance did not approve for the loop recorder due to their guidelines  The only way this would happen is if he were to wear a extended holter monitor first for 2 wks than have a doc read the results to determine wither its neccessary or not  Pt was not having this  He kept mentioning whats gonna happen if he gets another stroke and "dies" then its on our hands (lawsuit) I did mention that we did all we can on our end here to get it approved by the insurance but they are not budging at all  He mentioned about going to another insurance and have us deal with them  He is also upset because he got a call from the hospital already about his time for the loop implant and feels like the left doesn't know what the right is doing  I advised him that currently he is not scheduled for tomorrow loop implant and disregard the call sometimes it can happen when the hospital sees it scheduled the day before and calls the patient with the info even if it gets canceled without them realizing it  After explaining to him about the approval for the 2 wk zio and that Ill look out for the approval  He hanged up on me

## 2022-05-05 ENCOUNTER — TELEPHONE (OUTPATIENT)
Dept: CARDIOLOGY CLINIC | Facility: CLINIC | Age: 57
End: 2022-05-05

## 2022-05-05 DIAGNOSIS — R00.2 PALPITATION: ICD-10-CM

## 2022-05-05 DIAGNOSIS — I63.9 CEREBROVASCULAR ACCIDENT (CVA), UNSPECIFIED MECHANISM (HCC): Primary | ICD-10-CM

## 2022-05-05 NOTE — TELEPHONE ENCOUNTER
Harris Ca and Star Page, pt advocate from his health insurance company, conference called to ask about the plan on the monitor moving forward  There is discussion about the plan for a Zio patch, & the request was sent to Anand Martinez for [facility]  I spoke with Mary Jo Hernandez  She will keep me in the loop about the auth, and one of us will contact the patient and his health advocate

## 2022-05-09 ENCOUNTER — CLINICAL SUPPORT (OUTPATIENT)
Dept: CARDIOLOGY CLINIC | Facility: CLINIC | Age: 57
End: 2022-05-09
Payer: COMMERCIAL

## 2022-05-09 DIAGNOSIS — R00.2 PALPITATION: Primary | ICD-10-CM

## 2022-05-09 PROCEDURE — 99211 OFF/OP EST MAY X REQ PHY/QHP: CPT

## 2022-05-09 NOTE — PROGRESS NOTES
Robson Lieberman is here today under the direction of Tyesha Levine  Patch applied and all instructions for use given to patient in office

## 2022-06-02 ENCOUNTER — CLINICAL SUPPORT (OUTPATIENT)
Dept: CARDIOLOGY CLINIC | Facility: CLINIC | Age: 57
End: 2022-06-02
Payer: COMMERCIAL

## 2022-06-02 DIAGNOSIS — I63.9 CEREBROVASCULAR ACCIDENT (CVA), UNSPECIFIED MECHANISM (HCC): ICD-10-CM

## 2022-06-02 DIAGNOSIS — R00.2 PALPITATION: ICD-10-CM

## 2022-06-02 PROCEDURE — 93228 REMOTE 30 DAY ECG REV/REPORT: CPT | Performed by: INTERNAL MEDICINE

## 2022-06-03 ENCOUNTER — TELEPHONE (OUTPATIENT)
Dept: CARDIOLOGY CLINIC | Facility: CLINIC | Age: 57
End: 2022-06-03

## 2022-06-03 NOTE — TELEPHONE ENCOUNTER
Pt called on 5/27/2022 re: his Zio AT  He rec'd a letter from [facility] that the patch was rejected  I asked the pt to email the letter  He also has started a claim with [employer], his employer

## 2022-07-14 ENCOUNTER — OFFICE VISIT (OUTPATIENT)
Dept: CARDIOLOGY CLINIC | Facility: CLINIC | Age: 57
End: 2022-07-14
Payer: COMMERCIAL

## 2022-07-14 VITALS
DIASTOLIC BLOOD PRESSURE: 70 MMHG | SYSTOLIC BLOOD PRESSURE: 120 MMHG | WEIGHT: 134.2 LBS | HEART RATE: 80 BPM | BODY MASS INDEX: 21.66 KG/M2

## 2022-07-14 DIAGNOSIS — R73.03 PRE-DIABETES: ICD-10-CM

## 2022-07-14 DIAGNOSIS — R00.2 PALPITATION: ICD-10-CM

## 2022-07-14 DIAGNOSIS — I63.9 CEREBROVASCULAR ACCIDENT (CVA), UNSPECIFIED MECHANISM (HCC): Primary | ICD-10-CM

## 2022-07-14 DIAGNOSIS — I10 ESSENTIAL HYPERTENSION: ICD-10-CM

## 2022-07-14 PROCEDURE — 99214 OFFICE O/P EST MOD 30 MIN: CPT | Performed by: INTERNAL MEDICINE

## 2022-07-14 NOTE — PROGRESS NOTES
Cardiology Office Note  Rosy Councilman, MD Jerrell Brooklyn, MD Kenna Donning, DO, [address] MD Sita Velez DO, Colleen Hernandez DO, [facility] [place]  ----------------------------------------------------------------  [address]  [address], [address]    Kallie Byrd 59 y o  male MRN: [id number]  Unit/Bed#:  Encounter: [id number]      History of Present Illness: It was a pleasure to see Kallie Byrd in the office today for follow-up CV evaluation  He has a past medical history of CVA developing the right centrum semiovale into the right lentiform nucleus noted on MRI in March 2022, hypertension, pre diabetes, DVT and diverticulitis  He established care with us in March 2022  At that time, he had been working and typing a paper  He was having frequent typos which was very much unlike him  Due to his recurrent mistakes with typing, he took time off from work and felt foggy    When his symptoms did not improve over the course of the weekend, his PCP sent him for MRI of the brain which demonstrated acute CVA  He presented to [facility] for evaluation  We were asked to see him for PEGGY  PEGGY demonstrated no evidence of left atrial appendage thrombus, intracardiac mass or shunting  Although the patient had moderate degree of stenosis at the right LAZARO, it was felt very strongly by Neurology that this did not necessarily represent the etiology of the patient's stroke  For this reason, it was strongly recommended that the patient undergo placement of implantable loop recorder  Due to insurance, he was denied implantable loop recorder and in the meanwhile, was placed on Zio AT to evaluate for any atrial fibrillation  He was sent home with Zio AT monitor  We are here today to discuss the results  Denies chest pain, pressure, tightness or squeezing  Denies lightheadedness, dizziness or palpitations  No significant lower extremity swelling  Denies orthopnea paroxysmal nocturnal dyspnea  Review of Systems:  Review of Systems   Constitutional: Negative for decreased appetite, fever, weight gain and weight loss  HENT: Negative for congestion and sore throat  Eyes: Negative for visual disturbance  Cardiovascular: Negative for chest pain, dyspnea on exertion, leg swelling, near-syncope and palpitations  Respiratory: Negative for cough and shortness of breath  Hematologic/Lymphatic: Negative for bleeding problem  Skin: Negative for rash  Musculoskeletal: Negative for myalgias and neck pain  Gastrointestinal: Negative for abdominal pain and nausea  Neurological: Negative for light-headedness and weakness  Psychiatric/Behavioral: Negative for depression  Past Medical History:   Diagnosis Date    Diverticulosis     Hypertension     Mononucleosis     Prediabetes        No past surgical history on file  Social History     Socioeconomic History    Marital status: /Civil Union     Spouse name: Not on file    Number of children: Not on file    Years of education: Not on file    Highest education level: Not on file   Occupational History    Not on file   Tobacco Use    Smoking status: Never Smoker    Smokeless tobacco: Never Used   Vaping Use    Vaping Use: Never used   Substance and Sexual Activity    Alcohol use: Yes     Alcohol/week: 14 0 standard drinks     Types: 14 Cans of beer per week     Comment: socially    Drug use: Never    Sexual activity: Not on file   Other Topics Concern    Not on file   Social History Narrative    Not on file     Social Determinants of Health     Financial Resource Strain: Not on file   Food Insecurity: Not on file   Transportation Needs: Not on file   Physical Activity: Not on file   Stress: Not on file   Social Connections: Not on file   Intimate Partner Violence: Not on file   Housing Stability: Not on file       No family history on file      No Known Allergies      Current Outpatient Medications:     amLODIPine (NORVASC) 5 mg tablet, Take 1 tablet by mouth daily, Disp: , Rfl:     aspirin 81 mg chewable tablet, Chew 1 tablet (81 mg total) daily, Disp: , Rfl: 0    atorvastatin (LIPITOR) 80 mg tablet, Take 1 tablet (80 mg total) by mouth daily with dinner, Disp: 90 tablet, Rfl: 0    Vitals:    07/14/22 1300   BP: 120/70   BP Location: Right arm   Patient Position: Sitting   Cuff Size: Adult   Pulse: 80   Weight: 60 9 kg (134 lb 3 2 oz)     Body mass index is 21 66 kg/m²  PHYSICAL EXAMINATION:  Gen: Awake, Alert, NAD   Head/eyes: AT/NC, pupils equal and round, Anicteric  ENT: mmm  Neck: Supple, No elevated JVP, trachea midline  Resp: CTA bilaterally no w/r/r  CV: RRR +S1, S2, No m/r/g  Abd: Soft, NT/ND + BS  Ext: no LE edema bilaterally  Neuro: Follows commands, moves all extermities  Psych: Appropriate affect, normal mood, pleasant attitude, non-combative  Skin: warm; no rash, erythema or venous stasis changes on exposed skin    --------------------------------------------------------------------------------  TREADMILL STRESS  No results found for this or any previous visit      --------------------------------------------------------------------------------  NUCLEAR STRESS TEST: No results found for this or any previous visit  No results found for this or any previous visit       --------------------------------------------------------------------------------  CATH:  No results found for this or any previous visit     --------------------------------------------------------------------------------  ECHO:   No results found for this or any previous visit  No results found for this or any previous visit     --------------------------------------------------------------------------------  HOLTER  No results found for this or any previous visit      No results found for this or any previous visit     --------------------------------------------------------------------------------  CAROTIDS  No results found for this or any previous visit      --------------------------------------------------------------------------------  ECGs:  No results found for this visit on 07/14/22  Lab Results   Component Value Date    WBC 6 56 03/30/2022    HGB 14 7 03/30/2022    HCT 43 4 03/30/2022    MCV 93 03/30/2022     03/30/2022      Lab Results   Component Value Date    SODIUM 139 03/30/2022    K 3 8 03/30/2022     03/30/2022    CO2 28 03/30/2022    BUN 15 03/30/2022    CREATININE 0 80 03/30/2022    GLUC 143 (H) 03/30/2022    CALCIUM 8 8 03/30/2022      Lab Results   Component Value Date    HGBA1C 6 5 (H) 03/29/2022      No results found for: CHOL  Lab Results   Component Value Date    HDL 51 03/29/2022     Lab Results   Component Value Date    LDLCALC 136 (H) 03/29/2022     Lab Results   Component Value Date    TRIG 129 03/29/2022     No results found for: [place], [place]   Lab Results   Component Value Date    INR 1 02 03/29/2022    INR 0 98 10/18/2019    PROTIME 13 2 03/29/2022    PROTIME 12 6 10/18/2019        1  Cerebrovascular accident (CVA), unspecified mechanism ([address] )    2  Palpitation    3  Essential hypertension    4  Pre-diabetes        IMPRESSION:  · 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 s/p right centrum semiovale into the right lentiform nucleus by MRI, March 2022   ? 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  Event recorder w/ SR avg HR 77 bpm, rare APCs, atrial couplets/triplets, rare VPCs, ventricular couplets, nonsustained PSVT (x3) with longest run 15 4 seconds at 171 bpm, triggered events correlated with SR, July 2022  · History of DVT previously on Xarelto   · Hypertension   · Pre diabetes   · History of diverticulitis     PLAN:  It was a pleasure to see Ellis Froste in the office today for follow-up CV evaluation  He is here today following his Zio patch monitor  The Zio patch demonstrated runs of nonsustained PSVT with longest run at 15 4 seconds  He was asymptomatic during the episode  He has had a right-sided CVA that per Neurology notes did not appear to be caused by stenosis from any of the vessels  The not believe it was necessarily related to atherosclerosis  There was some concern for embolic source and he was strongly recommended for the placement of implantable loop recorder  He has no symptoms concerning for angina and no signs or symptoms of heart failure  He examines to be euvolemic in the office today  Blood pressure and heart rate are currently stable  He has been tolerating his current medications without any reported adverse effects  Based on his clinical presentation, I have the following recommendations:    1  Recommend placement of implantable loop recorder  Given Neurology recommendations clearly dictated in March 2022 and disbelief that stenosis was the etiology of the patient's disease process, it is reasonable for patient to be monitored for evidence of atrial arrhythmia that could have generated this CVA  2  Continue lifelong aspirin  3  High-intensity statin  Goal LDL is less than 70 mg/dL with history of CVA  Repeat lipid panel in 3-6 months  4  Continue amlodipine  Blood pressure is well controlled in the office today  5  Would encourage heart healthy diet low in sodium and carbohydrate  6  Recommend 30 minutes a day, 5 days a week of moderate intensity activity to build cardiovascular endurance  7  Follow-up with primary care for management of pre diabetes  8  We will follow up with him in 3 months to reassess his progress  As always, please do not hesitate to call with any questions  Portions of the record may have been created with voice recognition software   Occasional wrong word or "sound a like" substitutions may have occurred due to the inherent limitations of voice recognition software  Read the chart carefully and recognize, using context, where substitutions have occurred        Signed: Stephany Hilton DO, Tresea Harms

## 2022-07-26 ENCOUNTER — TELEPHONE (OUTPATIENT)
Dept: CARDIOLOGY CLINIC | Facility: CLINIC | Age: 57
End: 2022-07-26

## 2022-07-26 NOTE — TELEPHONE ENCOUNTER
Nash No,    Can we try to run this pts insurance for an auth?  Govind Hernandez updated his note on 7/14    Just want to know first before calling him     Thanks

## 2022-07-26 NOTE — TELEPHONE ENCOUNTER
----- Message from Bakari Hua sent at 7/14/2022  2:28 PM EDT -----    ----- Message -----  From: Nj Quezada DO  Sent: 7/14/2022   1:35 PM EDT  To: Georges David MA    I have updated a note and hopefully this will help the patient get loop recorder approved with insurance  I know he has been dealing with that and underwent Zio patch  See if we can reset admit using my note for approval   Thank you

## 2022-07-27 ENCOUNTER — OFFICE VISIT (OUTPATIENT)
Dept: OBGYN CLINIC | Facility: HOSPITAL | Age: 57
End: 2022-07-27
Payer: COMMERCIAL

## 2022-07-27 VITALS
SYSTOLIC BLOOD PRESSURE: 117 MMHG | BODY MASS INDEX: 21.58 KG/M2 | HEART RATE: 67 BPM | WEIGHT: 134.26 LBS | DIASTOLIC BLOOD PRESSURE: 75 MMHG | HEIGHT: 66 IN

## 2022-07-27 DIAGNOSIS — M65.4 TENOSYNOVITIS, DE QUERVAIN: Primary | ICD-10-CM

## 2022-07-27 PROCEDURE — 20550 NJX 1 TENDON SHEATH/LIGAMENT: CPT | Performed by: PHYSICIAN ASSISTANT

## 2022-07-27 PROCEDURE — 99203 OFFICE O/P NEW LOW 30 MIN: CPT | Performed by: PHYSICIAN ASSISTANT

## 2022-07-27 RX ORDER — BETAMETHASONE SODIUM PHOSPHATE AND BETAMETHASONE ACETATE 3; 3 MG/ML; MG/ML
3 INJECTION, SUSPENSION INTRA-ARTICULAR; INTRALESIONAL; INTRAMUSCULAR; SOFT TISSUE
Status: COMPLETED | OUTPATIENT
Start: 2022-07-27 | End: 2022-07-27

## 2022-07-27 RX ORDER — BUPIVACAINE HYDROCHLORIDE 2.5 MG/ML
2 INJECTION, SOLUTION EPIDURAL; INFILTRATION; INTRACAUDAL
Status: COMPLETED | OUTPATIENT
Start: 2022-07-27 | End: 2022-07-27

## 2022-07-27 RX ADMIN — BUPIVACAINE HYDROCHLORIDE 2 ML: 2.5 INJECTION, SOLUTION EPIDURAL; INFILTRATION; INTRACAUDAL at 10:10

## 2022-07-27 RX ADMIN — BETAMETHASONE SODIUM PHOSPHATE AND BETAMETHASONE ACETATE 3 MG: 3; 3 INJECTION, SUSPENSION INTRA-ARTICULAR; INTRALESIONAL; INTRAMUSCULAR; SOFT TISSUE at 10:10

## 2022-07-27 NOTE — PROGRESS NOTES
Assessment:    Right de Quervain tenosynovitis      Plan:    Steroid injection provided today the patient tolerated well   Post injection ice to the area is recommended   Rest as able, with activities to tolerance  Can use removable brace until symptoms are better   Recommend over-the-counter Voltaren gel to the area symptoms persist  He can follow up on a p r n  basis  Subjective:     HPI    Patient ID:  Bienvenido Iqbal is a 59 y o  male presenting for evaluation of his right wrist     The patient presents with a two month history of right radial sided wrist pain that came on without injury or trauma to the area  He states it is specifically located in this area and described as sharp and shooting only comes on with activities  At rest it is better  There is no associated numbness and tingling  He denies any weakness with his  strength  It does not affect his fingers  He has not noticed any swelling or redness about the wrist or hand  No fever or chills  No history of surgery to the right wrist     He has reported x-rays not uploaded into the system that were read as negative  The following portions of the patient's history were reviewed and updated as appropriate: allergies, current medications, past family history, past medical history, past social history, past surgical history and problem list     Review of Systems     Objective:    Imaging:  Images from outside facility reported by the patient as negative  There is no images or report today      Physical Exam     Vitals:    07/27/22 0938   BP: 117/75   Pulse: 67       Orthopedic Examination:  Right wrist    Inspection:  There is no open wounds or erythema  There is mild swelling of the first dorsal extensor compartment  Otherwise there is no significant swelling  Palpation:  There is no warmth of the skin  The first dorsal extensor compartment is tender to palpation    The dorsal wrist, distal radius ulna, anatomic snuffbox nontender  The [facility] joint is nontender  Range-of-motion:  Normal range of motion of the wrist joint and thumb joint without restriction or pain  Strength:  5/5 wrist flexion extension, thumb flexion extension    Sensation:  Intact median radial ulnar nerve distribution    Special Tests:  Strong positive Finkelstein's test   Negative grind test   Negative Durkan's compression test, negative Tinel's test   Negative Phalen's test     Hand/upper extremity injection: R extensor compartment 1  Universal Protocol:  Consent: Verbal consent obtained    Risks and benefits: risks, benefits and alternatives were discussed  Consent given by: patient  Patient identity confirmed: verbally with patient    Supporting Documentation  Indications: pain   Procedure Details  Condition:de Quervain's tenosynovitis Site: R extensor compartment 1   Needle size: 25 G  Medications administered: 2 mL bupivacaine (PF) 0 25 %; 3 mg betamethasone acetate-betamethasone sodium phosphate 6 (3-3) mg/mL    Patient tolerance: patient tolerated the procedure well with no immediate complications  Dressing:  Sterile dressing applied

## 2022-08-04 NOTE — TELEPHONE ENCOUNTER
Left a detailed message about scheduling his loop implant  He has already got the auth approved:  [employer] approved auth # [id number] for loop implant/[id number] & [id number] @ [facility]   Valid dates:  7/29/22-10/26/22     This was an issue before but now it looks good to schedule  He was an irate patient at the time to set up his loop a few months back      Possible date for loop is 9/21 Eulogio Pabon

## 2022-08-08 NOTE — TELEPHONE ENCOUNTER
Pt is scheduled for loop implant on 9/21 with [facility] or [facility]    Pt is aware of the instructions (mychart)    No allergies    No med holds    He has already got the [place] approved:  [employer] approved auth # [id number] for loop implant/[id number] &  @ [facility]   Valid dates:  7/29/22-10/26/22

## 2022-09-21 ENCOUNTER — HOSPITAL ENCOUNTER (OUTPATIENT)
Facility: HOSPITAL | Age: 57
Setting detail: OUTPATIENT SURGERY
Discharge: HOME | End: 2022-09-21
Attending: INTERNAL MEDICINE | Admitting: INTERNAL MEDICINE
Payer: COMMERCIAL

## 2022-09-21 DIAGNOSIS — I63.9 CEREBROVASCULAR ACCIDENT (CVA) DUE TO VASCULAR OCCLUSION (HCC): ICD-10-CM

## 2022-09-21 PROCEDURE — 33285 INSJ SUBQ CAR RHYTHM MNTR: CPT | Performed by: PHYSICIAN ASSISTANT

## 2022-09-21 PROCEDURE — NC001 PR NO CHARGE: Performed by: INTERNAL MEDICINE

## 2022-09-21 PROCEDURE — 33285 INSJ SUBQ CAR RHYTHM MNTR: CPT | Performed by: INTERNAL MEDICINE

## 2022-09-21 PROCEDURE — NC001 PR NO CHARGE: Performed by: PHYSICIAN ASSISTANT

## 2022-09-21 PROCEDURE — C1764 EVENT RECORDER, CARDIAC: HCPCS | Performed by: INTERNAL MEDICINE

## 2022-09-21 DEVICE — LOOP RECORDER REVEAL LINQ SYSTEM: Type: IMPLANTABLE DEVICE | Site: CHEST  WALL | Status: FUNCTIONAL

## 2022-09-21 RX ORDER — LIDOCAINE HYDROCHLORIDE 10 MG/ML
INJECTION, SOLUTION EPIDURAL; INFILTRATION; INTRACAUDAL; PERINEURAL
Status: DISCONTINUED
Start: 2022-09-21 | End: 2022-09-21 | Stop reason: HOSPADM

## 2022-09-21 RX ORDER — LIDOCAINE HYDROCHLORIDE 10 MG/ML
INJECTION, SOLUTION EPIDURAL; INFILTRATION; INTRACAUDAL; PERINEURAL AS NEEDED
Status: DISCONTINUED | OUTPATIENT
Start: 2022-09-21 | End: 2022-09-21 | Stop reason: HOSPADM

## 2022-09-21 NOTE — DISCHARGE INSTRUCTIONS
OK to shower with with the glue, glue will fall off in 1 week on its own, do not scrub the area or swim during the next 14 days  not use lotions/powders/creams on incision  Remove outer bandage on for 24 hours after procedure  Please call the office [phone] if you notice redness, swelling, bleeding, or drainage from incision or if you develop fevers  Cardiac Loop Recorder Insertion      WHAT YOU SHOULD KNOW:    A cardiac loop recorder is a device used to diagnose heart rhythm problems, such as a fast or irregular heartbeat  It is implanted in your left chest, just under the skin  The device records a pattern of your heart's rhythm, called an EKG  Your device records automatic EKGs, depending on how your caregiver programs it  You may also receive a handheld controller  You press a button on the controller when you have symptoms, such as dizziness, lightheadedness, or palpitations  The device will record an EKG at that moment  The recording can help your caregiver see if your symptoms may be caused by heart rhythm problems  Your caregiver will remove the device after it has collected enough data  You may need the device for up to 5 years  The procedure to remove the device is similar to the procedure used to implant it  AFTER YOU LEAVE:    Follow up with our loop recorder clinic: You will need to return in 1 to 2 weeks to meet the staff in our loop recorder clinic  At this appointment they will check your incision and remove your stitches  We will discuss how we retrieve data from your loop recorder at this appointment  You will be able to transmit data from your device from home as well, this will also be explained by our loop recorder clinic staff  Ask for information about this process  Write down your questions so you remember to ask them during your visits        Wound care: the glue over your loop recorder is water proof, you can shower as your normally wound, please do not pick the glue off the wound  Do not use lotions/powders/creams on incision  Remove outer bandage 24 hours after procedure, you will notice a few stitches which will be removed at your two week follow up appointment  Please call the office if you notice redness, swelling, bleeding, or drainage from incision or if you develop fevers  Keep the loop recorder area clean until it heals  Return to activity: If you received anesthesia, you will not be able to drive for 24 hours  Otherwise, most people can return to normal activities soon after the procedure  Your cardiologist may want to know if your work involves electrical current or high-voltage equipment  Ask about other electrical items that could interfere with your cardiac loop recorder  Contact your cardiologist if:   You have a fever or chills  Your wound is red, swollen, or draining pus  You have questions or concerns about your condition or care  Seek care immediately or call 911 if: You feel weak, dizzy, or faint  You lose consciousness  © 2014 [employer]27 [employer] is for End User's use only and may not be sold, redistributed or otherwise used for commercial purposes  All illustrations and images included in CareNotes® are the copyrighted property of [email or web address]  or Chase [employer]  The above information is an  only  It is not intended as medical advice for individual conditions or treatments  Talk to your doctor, nurse or pharmacist before following any medical regimen to see if it is safe and effective for you

## 2022-10-03 ENCOUNTER — TELEPHONE (OUTPATIENT)
Dept: OBGYN CLINIC | Facility: HOSPITAL | Age: 57
End: 2022-10-03

## 2022-10-03 NOTE — TELEPHONE ENCOUNTER
Hello,    Please see force on request below:    Name: Sophie Obando    : 61    MRN: [id number]    #:     Insurance: [facility]    Reason for Appt: R wrist discomfort  Seen Prudence Araujo 22    Requesting Doctor/Location: [place]/ any         Thank You,

## 2022-10-05 ENCOUNTER — IN-CLINIC DEVICE VISIT (OUTPATIENT)
Dept: CARDIOLOGY CLINIC | Facility: CLINIC | Age: 57
End: 2022-10-05
Payer: COMMERCIAL

## 2022-10-05 DIAGNOSIS — Z95.818 PRESENCE OF OTHER CARDIAC IMPLANTS AND GRAFTS: Primary | ICD-10-CM

## 2022-10-05 PROCEDURE — 93291 INTERROG DEV EVAL SCRMS IP: CPT | Performed by: INTERNAL MEDICINE

## 2022-10-05 NOTE — PROGRESS NOTES
Results for orders placed or performed in visit on 10/05/22   Cardiac EP device report    Narrative    MDT [address] [place] [address] INTERROGATED IN THE [email or web address] OFFICE  BATTERY STATUS "GOOD"  NO DEVICE DETECTED & NO PT ACTIVATED EPISODES  PRESENTING RHYTHM NSR  NORMAL DEVICE FUNCTION  WOUND CHECK: INCISION CLEAN AND DRY WITH EDGES APPROXIMATED; SUTURES REMOVED; WOUND CARE AND RESTRICTIONS REVIEWED WITH PATIENT   GV

## 2022-10-18 ENCOUNTER — OFFICE VISIT (OUTPATIENT)
Dept: OBGYN CLINIC | Facility: CLINIC | Age: 57
End: 2022-10-18
Payer: COMMERCIAL

## 2022-10-18 VITALS
HEART RATE: 62 BPM | WEIGHT: 134 LBS | DIASTOLIC BLOOD PRESSURE: 80 MMHG | HEIGHT: 66 IN | BODY MASS INDEX: 21.53 KG/M2 | SYSTOLIC BLOOD PRESSURE: 125 MMHG

## 2022-10-18 DIAGNOSIS — M65.4 TENOSYNOVITIS, DE QUERVAIN: Primary | ICD-10-CM

## 2022-10-18 PROCEDURE — 99024 POSTOP FOLLOW-UP VISIT: CPT | Performed by: SURGERY

## 2022-10-18 PROCEDURE — 20550 NJX 1 TENDON SHEATH/LIGAMENT: CPT | Performed by: SURGERY

## 2022-10-18 RX ORDER — LIDOCAINE HYDROCHLORIDE 10 MG/ML
3 INJECTION, SOLUTION INFILTRATION; PERINEURAL
Status: COMPLETED | OUTPATIENT
Start: 2022-10-18 | End: 2022-10-18

## 2022-10-18 RX ORDER — BETAMETHASONE SODIUM PHOSPHATE AND BETAMETHASONE ACETATE 3; 3 MG/ML; MG/ML
6 INJECTION, SUSPENSION INTRA-ARTICULAR; INTRALESIONAL; INTRAMUSCULAR; SOFT TISSUE
Status: COMPLETED | OUTPATIENT
Start: 2022-10-18 | End: 2022-10-18

## 2022-10-18 RX ADMIN — LIDOCAINE HYDROCHLORIDE 3 ML: 10 INJECTION, SOLUTION INFILTRATION; PERINEURAL at 12:12

## 2022-10-18 RX ADMIN — BETAMETHASONE SODIUM PHOSPHATE AND BETAMETHASONE ACETATE 6 MG: 3; 3 INJECTION, SUSPENSION INTRA-ARTICULAR; INTRALESIONAL; INTRAMUSCULAR; SOFT TISSUE at 12:12

## 2022-10-18 NOTE — PROGRESS NOTES
ORTHOPAEDIC HAND, WRIST, AND ELBOW OFFICE  VISIT       ASSESSMENT/PLAN:      59 y o male who presents with Lenoard Dorman Tendonitis of Right wrist    Diagnostics reviewed and physical exam performed  Diagnosis, treatment options and associated risks were discussed with the patient including no treatment, nonsurgical treatment and potential for surgical intervention  The patient was given the opportunity to ask questions regarding each  The patient verbalized understanding of exam findings and treatment plan  We engaged in the shared decision-making process and treatment options were discussed at length with the patient  Surgical and conservative management discussed today along with risks and benefits  Pt was offered, accepted, performed and injection of Cortisone to Right radial wrist for symptomatic relief  Pt tolerated injections well  Ice and post injection protocol advised  Weigh bearing activities as tolerated  Spoke with pt that if injection does not give long lasting relief that tendon release surgery could be the next option      Diagnoses and all orders for this visit:    Tenosynovitis, de Quervain  -     Hand/upper extremity injection        Follow Up:  Return if symptoms worsen or fail to improve  General Discussions:  Doyce Gauze Tenosynovitis: The anatomy and physiology of de Quervain's tenosynovitis was discussed with the patient today in the office  Edema and increased contact pressure within the first dorsal extensor compartment at the radial styloid can cause pain, crepitation, and limitation of function  Treatment options include resting thumb spica splints to decrease edema, oral anti-inflammatory medications, home or formal therapy exercises, up to 2 steroid injections within the first dorsal extensor compartment, or surgical release  While majority of patients do respond to conservative treatment, up to 20% may require surgical release       Operative Discussions:  Nickie Schultz Quervain Release: The anatomy and physiology of de Quervain's tenosynovitis was discussed with the patient today in the office  Edema and increased contact pressure within the first dorsal extensor compartment at the radial styloid can cause pain, crepitation, and limitation of function  Treatment options include resting thumb spica splints to decrease edema, oral anti-inflammatory medications, home or formal therapy exercises, up to 2 steroid injections within the first dorsal extensor compartment, or surgical release  While majority of patients do respond to conservative treatment, up to 20% may require surgical release  The patient has elected to undergo a release of the first dorsal extensor compartment (de Quervain's)  A small incision will be made over the radial styloid of the wrist, the tendon sheath holding the abductor pollicis longus and extensor pollicis brevis will be released  In the postoperative period, light activities are allowed immediately, driving is allowed when narcotic medication has stopped, and the incision may get wet after 2 days  Heavy activities (lifting more than approximately 10 pounds) will be allowed after the follow up appointment in 1-2 weeks  While the pain and discomfort within the wrist typically improves rapidly, some residual discomfort may be present for up to 6 weeks  The patient may notice temporary numbness within the superficial sensory branch of the radial nerve secondary to a traction neuropraxia  This is often a self-limiting condition  The patient has an understanding of the above mentioned discussion  Approximate success rate is 98%  The risks and benefits of the procedure were explained to the patient, which include, but are not limited to: Bleeding, infection, recurrence, pain, scar, damage to tendons, damage to nerves, and damage to blood vessels, failure to give desired results and complications related to anesthesia    These risks, along with alternative conservative treatment options, and postoperative protocols were voiced back and understood by the patient  All questions were answered to the patient's satisfaction  The patient agrees to comply with a standard postoperative protocol, and is willing to proceed  Education was provided via written and auditory forms  There were no barriers to learning  Written handouts regarding wound care, incision and scar care, and general preoperative information was provided to the patient  Prior to surgery, the patient may be requested to stop all anti-inflammatory medications  Prophylactic aspirin, Plavix, and Coumadin may be allowed to be continued  Medications including vitamin E , ginkgo, and fish oil are requested to be stopped approximately one week prior to surgery  Hypertensive medications and beta blockers, if taken, should be continued  ____________________________________________________________________________________________________________________________________________      CHIEF COMPLAINT:  Chief Complaint   Patient presents with   • Right Wrist - Pain       SUBJECTIVE:  Kallie Byrd is a 59y o  year old  male who presents with Right radial wrist pain    Pt states he had an injection on 7/27/2022 that gave him about 3 months relief  He states he has noticed the pain is returning and was seeking another injection        I have personally reviewed all the relevant PMH, PSH, SH, FH, Medications and allergies      PAST MEDICAL HISTORY:  Past Medical History:   Diagnosis Date   • Diverticulosis    • Hypertension    • Mononucleosis    • Prediabetes        PAST SURGICAL HISTORY:  Past Surgical History:   Procedure Laterality Date   • CARDIAC ELECTROPHYSIOLOGY PROCEDURE N/A 9/21/2022    Procedure: Cardiac loop recorder implant;  Surgeon: Donald Del Toro MD;  Location: [facility]; Service: Cardiology       FAMILY HISTORY:  History reviewed  No pertinent family history      SOCIAL HISTORY:  Social History     Tobacco Use   • Smoking status: Never Smoker   • Smokeless tobacco: Never Used   Vaping Use   • Vaping Use: Never used   Substance Use Topics   • Alcohol use: Yes     Alcohol/week: 14 0 standard drinks     Types: 14 Cans of beer per week     Comment: socially   • Drug use: Never       MEDICATIONS:    Current Outpatient Medications:   •  amLODIPine (NORVASC) 5 mg tablet, Take 1 tablet by mouth daily, Disp: , Rfl:   •  aspirin 81 mg chewable tablet, Chew 1 tablet (81 mg total) daily, Disp: , Rfl: 0  •  atorvastatin (LIPITOR) 80 mg tablet, Take 1 tablet (80 mg total) by mouth daily with dinner, Disp: 90 tablet, Rfl: 0    ALLERGIES:  No Known Allergies        REVIEW OF SYSTEMS:  Review of Systems   Constitutional: Negative for chills and fever  HENT: Negative for ear pain and sore throat  Eyes: Negative for pain and visual disturbance  Respiratory: Negative for cough and shortness of breath  Cardiovascular: Negative for chest pain and palpitations  Gastrointestinal: Negative for abdominal pain and vomiting  Genitourinary: Negative for dysuria and hematuria  Musculoskeletal: Negative for arthralgias and back pain  Skin: Negative for color change and rash  Neurological: Negative for seizures and syncope  All other systems reviewed and are negative          VITALS:  Vitals:    10/18/22 1146   BP: 125/80   Pulse: 62       LABS:  HgA1c:   Lab Results   Component Value Date    HGBA1C 6 5 (H) 03/29/2022     BMP:   Lab Results   Component Value Date    CALCIUM 8 8 03/30/2022    K 3 8 03/30/2022    CO2 28 03/30/2022     03/30/2022    BUN 15 03/30/2022    CREATININE 0 80 03/30/2022       _____________________________________________________  PHYSICAL EXAMINATION:  General: well developed and well nourished, alert, oriented times 3 and appears comfortable  Psychiatric: Normal  HEENT: Normocephalic, Atraumatic Trachea Midline, No torticollis  Pulmonary: No audible wheezing or respiratory distress   Abdomen/GI: Non tender, non distended   Cardiovascular: No pitting edema, 2+ radial pulse   Skin: No masses, erythema, lacerations, fluctation, ulcerations  Neurovascular: Sensation Intact to the Median, Ulnar, Radial Nerve, Motor Intact to the Median, Ulnar, Radial Nerve and Pulses Intact  Musculoskeletal: Normal, except as noted in detailed exam and in HPI  MUSCULOSKELETAL EXAMINATION:    NT  ___________________________________________________  STUDIES REVIEWED:  No studies reviewed          PROCEDURES PERFORMED:   Hand/upper extremity injection  Universal Protocol:  Consent: Verbal consent obtained    Risks and benefits: risks, benefits and alternatives were discussed  Consent given by: patient  Timeout called at: 10/18/2022 12:07 PM   Patient understanding: patient states understanding of the procedure being performed  Site marked: the operative site was marked  Patient identity confirmed: verbally with patient    Supporting Documentation  Indications: pain and tendon swelling   Procedure Details  Condition:de Quervain's tenosynovitis Needle size: 22 G  Ultrasound guidance: no  Approach: radial  Medications administered: 3 mL lidocaine 1 %; 6 mg betamethasone acetate-betamethasone sodium phosphate 6 (3-3) mg/mL    Patient tolerance: patient tolerated the procedure well with no immediate complications  Dressing:  Sterile dressing applied           _____________________________________________________      Hai Patricia    I,:  Angela Allred am acting as a scribe while in the presence of the attending physician :       I,:  Chaya Cruz MD personally performed the services described in this documentation    as scribed in my presence :

## 2022-12-03 NOTE — PROGRESS NOTES
Cardiology Follow Up    Nevaeh Reyez  1965  [id number]  [address] [id number] [phone]    1  Essential hypertension        2  Cerebrovascular accident (CVA), unspecified mechanism ([address] )        3  Palpitation            Interval History:  Patient here for follow-up visit  He had stroke noted on MRI of March 2022  He has HTN, pre-DM,  DVT and diverticulitis  PEGGY done March 2022 in reference to workup for CVA was negative  LVEF was preserved at 65%  There was no significant valve disease  ZIO patch done June 2022 demonstrated brief burst of SVT but no Afib  Patient with ILR in place which was implanted September 2022  Interrogation done October 2022 demonstrated no significant arrhythmia  Patient has had no chest pain or significant dyspnea  His vital signs are stable today  He is now on metformin  Patient Active Problem List   Diagnosis   • Stroke   • Hypertension   • History of DVT (deep vein thrombosis)   • Hyperlipidemia   • Prediabetes     Past Medical History:   Diagnosis Date   • Diverticulosis    • Hypertension    • Mononucleosis    • Prediabetes      Social History     Socioeconomic History   • Marital status: /Civil Union     Spouse name: Not on file   • Number of children: Not on file   • Years of education: Not on file   • Highest education level: Not on file   Occupational History   • Not on file   Tobacco Use   • Smoking status: Never   • Smokeless tobacco: Never   Vaping Use   • Vaping Use: Never used   Substance and Sexual Activity   • Alcohol use:  Yes     Alcohol/week: 14 0 standard drinks     Types: 14 Cans of beer per week     Comment: socially   • Drug use: Never   • Sexual activity: Not on file   Other Topics Concern   • Not on file   Social History Narrative   • Not on file     Social Determinants of Health     Financial Resource Strain: Not on file Food Insecurity: Not on file   Transportation Needs: Not on file   Physical Activity: Not on file   Stress: Not on file   Social Connections: Not on file   Intimate Partner Violence: Not on file   Housing Stability: Not on file      No family history on file  Past Surgical History:   Procedure Laterality Date   • CARDIAC ELECTROPHYSIOLOGY PROCEDURE N/A 9/21/2022    Procedure: Cardiac loop recorder implant;  Surgeon: Michael Ríos MD;  Location: [facility]; Service: Cardiology       Current Outpatient Medications:   •  amLODIPine (NORVASC) 5 mg tablet, Take 1 tablet by mouth daily, Disp: , Rfl:   •  aspirin 81 mg chewable tablet, Chew 1 tablet (81 mg total) daily, Disp: , Rfl: 0  •  atorvastatin (LIPITOR) 80 mg tablet, Take 1 tablet (80 mg total) by mouth daily with dinner, Disp: 90 tablet, Rfl: 0  •  metFORMIN (GLUCOPHAGE) 500 mg tablet, Take 500 mg by mouth daily, Disp: , Rfl:   No Known Allergies    Labs:not applicable  Imaging: No results found  Review of Systems:  Review of Systems   All other systems reviewed and are negative  Physical Exam:  /66 (BP Location: Right arm, Patient Position: Sitting, Cuff Size: Standard)   Pulse 76   Ht 6' (1 829 m)   Wt 61 2 kg (135 lb)   SpO2 97%   BMI 18 31 kg/m²   Physical Exam  Vitals reviewed  Constitutional:       Appearance: He is well-developed  HENT:      Head: Normocephalic and atraumatic  Cardiovascular:      Rate and Rhythm: Normal rate  Heart sounds: Normal heart sounds  Pulmonary:      Effort: Pulmonary effort is normal       Breath sounds: Normal breath sounds  Musculoskeletal:      Cervical back: Normal range of motion  Skin:     General: Skin is warm and dry  Neurological:      Mental Status: He is alert and oriented to person, place, and time  Discussion/Summary:I will continue the patient's present medical regimen  The patient appears well compensated    I have asked the patient to call if there is a problem in the interim otherwise I will see the patient in six months time

## 2022-12-12 ENCOUNTER — OFFICE VISIT (OUTPATIENT)
Dept: CARDIOLOGY CLINIC | Facility: CLINIC | Age: 57
End: 2022-12-12

## 2022-12-12 VITALS
SYSTOLIC BLOOD PRESSURE: 116 MMHG | BODY MASS INDEX: 18.28 KG/M2 | DIASTOLIC BLOOD PRESSURE: 66 MMHG | OXYGEN SATURATION: 97 % | WEIGHT: 135 LBS | HEART RATE: 76 BPM | HEIGHT: 72 IN

## 2022-12-12 DIAGNOSIS — R00.2 PALPITATION: ICD-10-CM

## 2022-12-12 DIAGNOSIS — I63.9 CEREBROVASCULAR ACCIDENT (CVA), UNSPECIFIED MECHANISM (HCC): ICD-10-CM

## 2022-12-12 DIAGNOSIS — I10 ESSENTIAL HYPERTENSION: Primary | ICD-10-CM

## 2023-01-13 ENCOUNTER — REMOTE DEVICE CLINIC VISIT (OUTPATIENT)
Dept: CARDIOLOGY CLINIC | Facility: CLINIC | Age: 58
End: 2023-01-13

## 2023-01-13 DIAGNOSIS — Z95.818 PRESENCE OF OTHER CARDIAC IMPLANTS AND GRAFTS: Primary | ICD-10-CM

## 2023-06-01 ENCOUNTER — OFFICE VISIT (OUTPATIENT)
Dept: OBGYN CLINIC | Facility: CLINIC | Age: 58
End: 2023-06-01

## 2023-06-01 VITALS
HEART RATE: 60 BPM | WEIGHT: 135 LBS | HEIGHT: 72 IN | BODY MASS INDEX: 18.28 KG/M2 | SYSTOLIC BLOOD PRESSURE: 119 MMHG | DIASTOLIC BLOOD PRESSURE: 74 MMHG

## 2023-06-01 DIAGNOSIS — M65.4 TENOSYNOVITIS, DE QUERVAIN: Primary | ICD-10-CM

## 2023-06-01 NOTE — PROGRESS NOTES
ASSESSMENT/PLAN:      59 y o male with right De Quervain's tenosynovitis    Etiology of above diagnosis was discussed as well as treatment options  Risks and benefits discussed at length and patient would like to proceed with right 1st dorsal compartment release surgery  Risks include, but are not limited to, bleeding, infection, pain, stiffness, swelling, injury to nerves, blood vessels, and surrounding structures, recurrence, need for further surgeries  Written informed consent was obtained  Swelling and stiffness may last for multiple months and might never completely resolve  Lab work and EKG will be obtained before surgery  Post op instructions and expectations were reviewed and provided in AVS  Return to office 10-14 days after surgery for suture removal     Right 1st dorsal compartment release  Local anesthesia  Consent obtained    The patient verbalized understanding of exam findings and treatment plan  We engaged in the shared decision-making process and treatment options were discussed at length with the patient  Surgical and conservative management discussed today along with risks and benefits  Diagnoses and all orders for this visit:    Tenosynovitis, de Stormy Linen Release: The anatomy and physiology of de Quervain's tenosynovitis was discussed with the patient today in the office  Edema and increased contact pressure within the first dorsal extensor compartment at the radial styloid can cause pain, crepitation, and limitation of function  Treatment options include resting thumb spica splints to decrease edema, oral anti-inflammatory medications, home or formal therapy exercises, up to 2 steroid injections within the first dorsal extensor compartment, or surgical release  While majority of patients do respond to conservative treatment, up to 20% may require surgical release  The patient has elected to undergo a release of the first dorsal extensor compartment (de Quervain's)    A small incision will be made over the radial styloid of the wrist, the tendon sheath holding the abductor pollicis longus and extensor pollicis brevis will be released  In the postoperative period, light activities are allowed immediately, driving is allowed when narcotic medication has stopped, and the incision may get wet after 2 days  Heavy activities (lifting more than approximately 10 pounds) will be allowed after the follow up appointment in 1-2 weeks  While the pain and discomfort within the wrist typically improves rapidly, some residual discomfort may be present for up to 6 weeks  The patient may notice temporary numbness within the superficial sensory branch of the radial nerve secondary to a traction neuropraxia  This is often a self-limiting condition  The patient has an understanding of the above mentioned discussion  Approximate success rate is 98%  The risks and benefits of the procedure were explained to the patient, which include, but are not limited to: Bleeding, infection, recurrence, pain, scar, damage to tendons, damage to nerves, and damage to blood vessels, failure to give desired results and complications related to anesthesia  These risks, along with alternative conservative treatment options, and postoperative protocols were voiced back and understood by the patient  All questions were answered to the patient's satisfaction  The patient agrees to comply with a standard postoperative protocol, and is willing to proceed  Education was provided via written and auditory forms  There were no barriers to learning  Written handouts regarding wound care, incision and scar care, and general preoperative information was provided to the patient  Prior to surgery, the patient may be requested to stop all anti-inflammatory medications  Prophylactic aspirin, Plavix, and Coumadin may be allowed to be continued    Medications including vitamin E , ginkgo, and fish oil are requested to be stopped approximately one week prior to surgery  Hypertensive medications and beta blockers, if taken, should be continued  Follow Up:  Return for 10-14 days post op for suture removal     To Do Next Visit:  Re-evaluation of current issue and Sutures out  ____________________________________________________________________________________________________________________________________________    CHIEF COMPLAINT:  Chief Complaint   Patient presents with   • Right Wrist - Follow-up     SUBJECTIVE:  Ann Rees is a 59y o  year old RHD male who presents for follow-up of right wrist pain  Patient was last seen in office on 10/18/2022  At that time he received steroid injection  He notes the injection lasted 3 to 4 weeks and then it wore off and pain came back  Patient reports pain when lifting his grandson, throwing a ball for him, and putting his wallet into his back pocket is also bothersome  Patient does have a brace and has not been using it as he does not find it beneficial  Patient has been taking Advil for pain as needed     I have personally reviewed all the relevant PMH, PSH, SH, FH, Medications and allergies  PAST MEDICAL HISTORY:  Past Medical History:   Diagnosis Date   • Diverticulosis    • Hypertension    • Mononucleosis    • Prediabetes      PAST SURGICAL HISTORY:  Past Surgical History:   Procedure Laterality Date   • CARDIAC ELECTROPHYSIOLOGY PROCEDURE N/A 9/21/2022    Procedure: Cardiac loop recorder implant;  Surgeon: Reginald Hurley MD;  Location: [facility]; Service: Cardiology     FAMILY HISTORY:  History reviewed  No pertinent family history  SOCIAL HISTORY:  Social History     Tobacco Use   • Smoking status: Never   • Smokeless tobacco: Never   Vaping Use   • Vaping Use: Never used   Substance Use Topics   • Alcohol use:  Yes     Alcohol/week: 14 0 standard drinks of alcohol     Types: 14 Cans of beer per week     Comment: socially   • Drug use: Never MEDICATIONS:    Current Outpatient Medications:   •  amLODIPine (NORVASC) 5 mg tablet, Take 1 tablet by mouth daily, Disp: , Rfl:   •  aspirin 81 mg chewable tablet, Chew 1 tablet (81 mg total) daily, Disp: , Rfl: 0  •  atorvastatin (LIPITOR) 80 mg tablet, Take 1 tablet (80 mg total) by mouth daily with dinner, Disp: 90 tablet, Rfl: 0  •  metFORMIN (GLUCOPHAGE) 500 mg tablet, Take 500 mg by mouth daily, Disp: , Rfl:     ALLERGIES:  No Known Allergies    REVIEW OF SYSTEMS:  Review of Systems   Constitutional: Negative for chills and fever  HENT: Negative for ear pain and sore throat  Eyes: Negative for pain and visual disturbance  Respiratory: Negative for cough and shortness of breath  Cardiovascular: Negative for chest pain and palpitations  Gastrointestinal: Negative for abdominal pain and vomiting  Genitourinary: Negative for dysuria and hematuria  Musculoskeletal: Negative for arthralgias and back pain  Skin: Negative for color change and rash  Neurological: Negative for seizures and syncope  All other systems reviewed and are negative      VITALS:  Vitals:    06/01/23 1039   BP: 119/74   Pulse: 60     LABS:  HgA1c:   Lab Results   Component Value Date    HGBA1C 6 9 (H) 12/02/2022     BMP:   Lab Results   Component Value Date    BUN 15 03/30/2022    CALCIUM 8 8 03/30/2022     03/30/2022    CO2 28 03/30/2022    CREATININE 0 80 03/30/2022    K 3 8 03/30/2022   _____________________________________________________  PHYSICAL EXAMINATION:  General: well developed and well nourished, alert, oriented times 3 and appears comfortable  Psychiatric: Normal  HEENT: Normocephalic, Atraumatic Trachea Midline, No torticollis  Pulmonary: No audible wheezing or respiratory distress   Cardiovascular: No pitting edema, 2+ radial pulse   Abdominal/GI: abdomen non tender, non distended   Skin: No masses, erythema, lacerations, fluctation, ulcerations  Neurovascular: Sensation Intact to the Median, Ulnar, Radial Nerve, Motor Intact to the Median, Ulnar, Radial Nerve and Pulses Intact  Musculoskeletal: Normal, except as noted in detailed exam and in HPI      MUSCULOSKELETAL EXAMINATION:  Right wrist:  Mild edema  No erythema or ecchymosis  TTP 1st dorsal compartment  Full composite fist  SILT  ___________________________________________________  STUDIES REVIEWED:  No imaging to review    PROCEDURES PERFORMED:  Procedures  No Procedures performed today  _____________________________________________________      Que Villegas    I,:  Anila Mendiola am acting as a scribe while in the presence of the attending physician :       I,:  Leticia Jordan MD personally performed the services described in this documentation    as scribed in my presence :

## 2023-07-14 ENCOUNTER — REMOTE DEVICE CLINIC VISIT (OUTPATIENT)
Dept: CARDIOLOGY CLINIC | Facility: CLINIC | Age: 58
End: 2023-07-14
Payer: COMMERCIAL

## 2023-07-14 DIAGNOSIS — Z95.818 PRESENCE OF OTHER CARDIAC IMPLANTS AND GRAFTS: Primary | ICD-10-CM

## 2023-07-14 PROCEDURE — G2066 INTER DEVC REMOTE 30D: HCPCS | Performed by: INTERNAL MEDICINE

## 2023-07-14 PROCEDURE — 93298 REM INTERROG DEV EVAL SCRMS: CPT | Performed by: INTERNAL MEDICINE

## 2023-07-14 NOTE — PROGRESS NOTES
MDT LNQ22/ ACTIVE SYSTEM IS MRI CONDITIONAL   CARELINK TRANSMISSION: LOOP RECORDER. PRESENTING RHYTHM NSR @ 72 BPM. BATTERY STATUS "OK." NO PATIENT OR DEVICE ACTIVATED EPISODES. NORMAL DEVICE FUNCTION.  DL

## 2023-07-21 ENCOUNTER — HOSPITAL ENCOUNTER (OUTPATIENT)
Facility: HOSPITAL | Age: 58
Setting detail: OUTPATIENT SURGERY
Discharge: HOME | End: 2023-07-21
Attending: SURGERY | Admitting: SURGERY
Payer: COMMERCIAL

## 2023-07-21 VITALS
OXYGEN SATURATION: 98 % | SYSTOLIC BLOOD PRESSURE: 129 MMHG | DIASTOLIC BLOOD PRESSURE: 74 MMHG | RESPIRATION RATE: 16 BRPM | HEART RATE: 70 BPM | TEMPERATURE: 97.8 F

## 2023-07-21 DIAGNOSIS — Z48.89 AFTERCARE FOLLOWING SURGERY: Primary | ICD-10-CM

## 2023-07-21 PROCEDURE — NC001 PR NO CHARGE: Performed by: SURGERY

## 2023-07-21 PROCEDURE — 25000 INCISION OF TENDON SHEATH: CPT | Performed by: SURGERY

## 2023-07-21 RX ORDER — OXYCODONE HYDROCHLORIDE 5 MG/1
5 TABLET ORAL EVERY 4 HOURS PRN
Status: DISCONTINUED | OUTPATIENT
Start: 2023-07-21 | End: 2023-07-21 | Stop reason: HOSPADM

## 2023-07-21 RX ORDER — ACETAMINOPHEN 325 MG/1
650 TABLET ORAL EVERY 6 HOURS PRN
Status: DISCONTINUED | OUTPATIENT
Start: 2023-07-21 | End: 2023-07-21 | Stop reason: HOSPADM

## 2023-07-21 RX ORDER — ACETAMINOPHEN AND CODEINE PHOSPHATE 300; 30 MG/1; MG/1
1 TABLET ORAL EVERY 12 HOURS PRN
Qty: 5 TABLET | Refills: 0 | Status: SHIPPED | OUTPATIENT
Start: 2023-07-21

## 2023-07-21 NOTE — INTERVAL H&P NOTE
H&P reviewed. After examining the patient I find no changes in the patients condition since the H&P had been written.     Vitals:    07/21/23 0906   BP: 141/78   Pulse: 76   Resp: 16   Temp: 97.8 °F (36.6 °C)   SpO2: 97%

## 2023-07-21 NOTE — DISCHARGE INSTR - AVS FIRST PAGE
Elevate hand above heart as much as possible to help pain and swelling. May use hand for simple tasks, but no heavy lifting or tight squeezing x 4 weeks. Keep operative bandage clean and dry. You may remove bandage in 4 days, just leave steri strips over incision. You are permitted to shower after 4 days with dressing off. Steri strips will fall off over the course of a few days. Perform simple finger motion exercises: opening & closing fingers 10 x every hr. Follow-up in the office 8/4/2023 per your scheduled appointment.

## 2023-07-21 NOTE — H&P
Hand Surgery H&P    ASSESSMENT/PLAN:       59 y.o male with right De Quervain's tenosynovitis     Etiology of above diagnosis was discussed as well as treatment options. Risks and benefits discussed at length and patient would like to proceed with right 1st dorsal compartment release surgery. Risks include, but are not limited to, bleeding, infection, pain, stiffness, swelling, injury to nerves, blood vessels, and surrounding structures, recurrence, need for further surgeries. Written informed consent was obtained. Swelling and stiffness may last for multiple months and might never completely resolve. Lab work and EKG will be obtained before surgery. Post op instructions and expectations were reviewed and provided in AVS. Return to office 10-14 days after surgery for suture removal.     Right 1st dorsal compartment release  Local anesthesia  Consent on chart     The patient verbalized understanding of exam findings and treatment plan. We engaged in the shared decision-making process and treatment options were discussed at length with the patient. Surgical and conservative management discussed today along with risks and benefits.     Diagnoses and all orders for this visit:     Tenosynovitis, de Quervain     [facility]: The anatomy and physiology of de Quervain's tenosynovitis was discussed with the patient today in the office. Edema and increased contact pressure within the first dorsal extensor compartment at the radial styloid can cause pain, crepitation, and limitation of function. Treatment options include resting thumb spica splints to decrease edema, oral anti-inflammatory medications, home or formal therapy exercises, up to 2 steroid injections within the first dorsal extensor compartment, or surgical release. While majority of patients do respond to conservative treatment, up to 20% may require surgical release.  The patient has elected to undergo a release of the first dorsal extensor compartment (de Quervain's). A small incision will be made over the radial styloid of the wrist, the tendon sheath holding the abductor pollicis longus and extensor pollicis brevis will be released. In the postoperative period, light activities are allowed immediately, driving is allowed when narcotic medication has stopped, and the incision may get wet after 2 days. Heavy activities (lifting more than approximately 10 pounds) will be allowed after the follow up appointment in 1-2 weeks. While the pain and discomfort within the wrist typically improves rapidly, some residual discomfort may be present for up to 6 weeks. The patient may notice temporary numbness within the superficial sensory branch of the radial nerve secondary to a traction neuropraxia. This is often a self-limiting condition. The patient has an understanding of the above mentioned discussion. Approximate success rate is 98%. The risks and benefits of the procedure were explained to the patient, which include, but are not limited to: Bleeding, infection, recurrence, pain, scar, damage to tendons, damage to nerves, and damage to blood vessels, failure to give desired results and complications related to anesthesia. These risks, along with alternative conservative treatment options, and postoperative protocols were voiced back and understood by the patient. All questions were answered to the patient's satisfaction. The patient agrees to comply with a standard postoperative protocol, and is willing to proceed. Education was provided via written and auditory forms. There were no barriers to learning. Written handouts regarding wound care, incision and scar care, and general preoperative information was provided to the patient. Prior to surgery, the patient may be requested to stop all anti-inflammatory medications. Prophylactic aspirin, Plavix, and Coumadin may be allowed to be continued.   Medications including vitamin E., ginkgo, and fish oil are requested to be stopped approximately one week prior to surgery. Hypertensive medications and beta blockers, if taken, should be continued.     Follow Up:  Return for 10-14 days post op for suture removal.     To Do Next Visit:  Re-evaluation of current issue and Sutures out  ____________________________________________________________________________________________________________________________________________     CHIEF COMPLAINT:      Chief Complaint   Patient presents with   • Right Wrist - Follow-up      SUBJECTIVE:  Santi Matthews is a 59y.o. year old RHD male who presents for follow-up of right wrist pain. Patient was last seen in office on 10/18/2022. At that time he received steroid injection. He notes the injection lasted 3 to 4 weeks and then it wore off and pain came back. Patient reports pain when lifting his grandson, throwing a ball for him, and putting his wallet into his back pocket is also bothersome. Patient does have a brace and has not been using it as he does not find it beneficial. Patient has been taking Advil for pain as needed     I have personally reviewed all the relevant PMH, PSH, SH, FH, Medications and allergies.      PAST MEDICAL HISTORY:  Medical History        Past Medical History:   Diagnosis Date   • Diverticulosis     • Hypertension     • Mononucleosis     • Prediabetes           PAST SURGICAL HISTORY:  Surgical History         Past Surgical History:   Procedure Laterality Date   • CARDIAC ELECTROPHYSIOLOGY PROCEDURE N/A 9/21/2022     Procedure: Cardiac loop recorder implant;  Surgeon: Greta Wilburn MD;  Location: [facility]; Service: Cardiology         FAMILY HISTORY:  Family History   History reviewed. No pertinent family history.        SOCIAL HISTORY:  Social History            Tobacco Use   • Smoking status: Never   • Smokeless tobacco: Never   Vaping Use   • Vaping Use: Never used   Substance Use Topics   • Alcohol use:  Yes       Alcohol/week: 14.0 standard drinks of alcohol       Types: 14 Cans of beer per week       Comment: socially   • Drug use: Never      MEDICATIONS:     Current Outpatient Medications:   •  amLODIPine (NORVASC) 5 mg tablet, Take 1 tablet by mouth daily, Disp: , Rfl:   •  aspirin 81 mg chewable tablet, Chew 1 tablet (81 mg total) daily, Disp: , Rfl: 0  •  atorvastatin (LIPITOR) 80 mg tablet, Take 1 tablet (80 mg total) by mouth daily with dinner, Disp: 90 tablet, Rfl: 0  •  metFORMIN (GLUCOPHAGE) 500 mg tablet, Take 500 mg by mouth daily, Disp: , Rfl:      ALLERGIES:  No Known Allergies     REVIEW OF SYSTEMS:  Review of Systems   Constitutional: Negative for chills and fever. HENT: Negative for ear pain and sore throat. Eyes: Negative for pain and visual disturbance. Respiratory: Negative for cough and shortness of breath. Cardiovascular: Negative for chest pain and palpitations. Gastrointestinal: Negative for abdominal pain and vomiting. Genitourinary: Negative for dysuria and hematuria. Musculoskeletal: Negative for arthralgias and back pain. Skin: Negative for color change and rash. Neurological: Negative for seizures and syncope.    All other systems reviewed and are negative.     VITALS:      Vitals:     06/01/23 1039   BP: 119/74   Pulse: 60      LABS:  HgA1c:         Lab Results   Component Value Date     HGBA1C 6.9 (H) 12/02/2022      BMP:         Lab Results   Component Value Date     BUN 15 03/30/2022     CALCIUM 8.8 03/30/2022      03/30/2022     CO2 28 03/30/2022     CREATININE 0.80 03/30/2022     K 3.8 03/30/2022   _____________________________________________________  PHYSICAL EXAMINATION:  General: well developed and well nourished, alert, oriented times 3 and appears comfortable  Psychiatric: Normal  HEENT: Normocephalic, Atraumatic Trachea Midline, No torticollis  Pulmonary: No audible wheezing or respiratory distress   Cardiovascular: No pitting edema, 2+ radial pulse   Abdominal/GI: abdomen non tender, non distended   Skin: No masses, erythema, lacerations, fluctation, ulcerations  Neurovascular: Sensation Intact to the Median, Ulnar, Radial Nerve, Motor Intact to the Median, Ulnar, Radial Nerve and Pulses Intact  Musculoskeletal: Normal, except as noted in detailed exam and in HPI.     MUSCULOSKELETAL EXAMINATION:  Right wrist:  Mild edema  No erythema or ecchymosis  TTP 1st dorsal compartment  Full composite fist  SILT  ___________________________________________________

## 2023-07-21 NOTE — OP NOTE
OPERATIVE REPORT  PATIENT NAME: Samira Brown    :  1965  MRN: [id number]  Pt Location: [facility] OR ROOM 06    SURGERY DATE: 2023    Surgeon(s) and Role:     * Alice Huizar MD - Primary    Preop Diagnosis:  Tenosynovitis, de Quervain [M65.4]    Post-Op Diagnosis Codes:     * Tenosynovitis, de Quervain [M65.4]    Procedure(s):  Right - RELEASE DEQUERVAINS. RIGHT    Specimen(s):  * No specimens in log *    Estimated Blood Loss:   Minimal    Drains:  * No LDAs found *    Anesthesia Type:   Local    Operative Indications:  Tenosynovitis, de Quervain [M65.4]      Operative Findings: Thickened first dorsal compartment sheath;  Sub-compartment in sheath    Complications:   None    Procedure and Technique: The right wrist was cleansed with alcohol. A field block was performed using lidocaine 1% and marcaine 0.25% with epinephrine. The right upper extremity was then prepped and draped in a sterile fashion. An incision was made over there first dorsal compartment in line with the first dorsal compartment starting from the radial styloid and extending proximally. Blunt dissection was performed down to the first dorsal compartment sheath while protecting the dorsal radial sensory nerve. The sheath was incised with a 15 blade scalpel, and then extended proximally and distally with tenotomy scissors. The tendons were retracted to allow inspection of the sheath. There was a sub-compartment in the dorsal leaflet. This was released as well. The wound was irrigated with saliine, and approximated with 3-0 vicryl deep dermal sutures. Dermabond was applied followed by steri strips, 4x4 gauze, and an ace bandage. The patient was transferred to recovery in stable condition. I was present for the entire procedure. Patient Disposition:  PACU     My Assistant was necessary throughout the procedure(s) for retraction and positioning.     I understand that section 1842 (b)(7)(D) of the Social Security Act generally prohibits Medicare physician fee schedule payment for the services of assistants-at-surgery in teaching hospitals when qualified residents are available to furnish such services. I certify that the services for which payment is claimed were medically necessary, and that no qualified resident was available to perform the services. I further understand that these services are subject to post-payment review by the Medicare carrier.       SIGNATURE: Mary Alice Jamil MD  DATE: July 21, 2023  TIME: 10:24 AM

## 2023-08-04 ENCOUNTER — OFFICE VISIT (OUTPATIENT)
Dept: OBGYN CLINIC | Facility: CLINIC | Age: 58
End: 2023-08-04

## 2023-08-04 VITALS
SYSTOLIC BLOOD PRESSURE: 119 MMHG | DIASTOLIC BLOOD PRESSURE: 70 MMHG | BODY MASS INDEX: 18.28 KG/M2 | WEIGHT: 135 LBS | HEIGHT: 72 IN

## 2023-08-04 DIAGNOSIS — M65.4 RADIAL STYLOID TENOSYNOVITIS (DE QUERVAIN): Primary | ICD-10-CM

## 2023-08-04 PROCEDURE — 99024 POSTOP FOLLOW-UP VISIT: CPT | Performed by: PHYSICIAN ASSISTANT

## 2023-08-04 RX ORDER — BLOOD SUGAR DIAGNOSTIC
STRIP MISCELLANEOUS
COMMUNITY
Start: 2023-07-20

## 2023-08-04 NOTE — PROGRESS NOTES
PATIENT NAME: Solo Damon    :  1965  MRN: [id number]   SURGERY DATE:  2023  PROCEDURE:   Right - RELEASE DEQUERVAINS        Subjective:  Patient reports doing well overall, sharp pain in the right wrist is gone. Has some's mild swelling about the incisional site that is nonpainful. No issues with his incision. No fever or chills. Objective:    Vitals:    23 0831   BP: 119/70     Right wrist     Incision is healed, no erythema or drainage. There is mild subcutaneous swelling. Normal wrist range of motion, full composite fist without pain  Patient is able to flex and extend the thumb without any pain. Sensation intact to light touch  Palpable radial pulse      Assessment:  S/p Right - RELEASE DEQUERVAINS 2023. Doing well, symptoms resolved. Plan: Activities as tolerated and increase as able. No specific restrictions from our standpoint. May massage and moisturize the incisional area. Apply ice to the area to reduce some of the mild swelling that is present. Follow-up on an as-needed basis with instructions to call the office with any questions or concerns.

## 2024-02-01 NOTE — PLAN OF CARE
Physical Therapy    Visit Type: treatment  SUBJECTIVE  Patient agreed to participate in therapy this date.  Jorge reported that his vision and unsteadiness felt improved from yesterday.  Patient / Family Goal: return to previous functional status    Pain   Patient does not demonstrate pain behaviors.     OBJECTIVE     Cognitive Status   Level of Consciousness   - alert  Arousal Alertness   - appropriate responses to stimuli  Affect/Behavior    - cooperative and flat  Functional Communication   - Overall Status: within functional limits   - Forms of Communication: verbal  Attention Span    - Attention: - attends with cues to redirect  Following Direction   - follows one step commands with repetition and follows one step commands with increased time  Transition Between Tasks   - transitions with cues  Executive Function    - Problem Solving: impaired   - Planning: impaired  Memory   - appears intact  Safety Awareness/Insight   - impulsive and decreased awareness of need for assistance  Awareness of Deficits   - decreased awareness of deficits    Vitals:  Blood Pressure (mmhg): 120/70  Vitals remained stable with activity.    Patient Activity Tolerance: no rest required      Range of Motion (ROM)   (degrees unless noted; active unless noted; norms in ( ); negative=lacking to 0, positive=beyond 0)  WFL: LUE, RUE  WFL: LLE, RLE    Strength  (out of 5 unless noted, standard test position unless noted)   Shoulder:     - Flexion:          Left: 5          Right: 5   Elbow/Forearm:    - Flexion:         Left: 5         Right: 5     - Extension:         Left: 5         Right: 5   Hip:    - Flexion:         Left: 4+         Right: 4+  Knee:    - Flexion:         Left: 5         Right: 5    - Extension:         Left: 5         Right: 5  Ankle:    - Dorsiflexion:         Left: 5         Right: 5    - Plantar Flexion:         Left: 5         Right: 5      Sitting Balance  (ZULLY = base of support)  Static      - Trial 1 details:  Problem: PHYSICAL THERAPY ADULT  Goal: Performs mobility at highest level of function for planned discharge setting  See evaluation for individualized goals  Description: Treatment/Interventions: Functional transfer training,Patient/family training,Bed mobility,Gait training,Spoke to nursing          See flowsheet documentation for full assessment, interventions and recommendations  Outcome: Completed  Note: Prognosis: Good  Problem List: Decreased coordination (mildly slowed compared to R hand on L  )  Assessment: Nhi Shane is a 59 y o  right handed male with hypertension, prediabetes, history of DVT previously on Xarelto, history of diverticulitis, presents with stroke found on outpatient MRI brain  Noted to have difficulty typing, difficulty concentrating, cognitive difficulty during meetings with work, + fatigue  Admitted with CVA on MRI: recent infarct right centrum semiovale extending into the right lentiform nucleus measures 2 9 x 1 6 cm  Planned PEGGY, on tele, neuro checks  Goal normotension with CVA likely occurring 3/24  PT consulted  Up and OOB as tolerated orders  Prior to admission independent without AD use  No endurance limitations  Working full time as    NO falls, no use of DME  Upon examination of strength, balance, activity tolerance, functional mobility, sensation, coordination and locomotion all WFL  R handed  Mild slowness in comparison to RUE with finger to nose, digit opposition, however + demonstrates accuracy with same  Independent with all aspects of mobility  No LOB with ambulation  Gait steady and fluent with increased speed  /82  The patient's AM-PAC Basic Mobility Inpatient Short Form Raw Score is 24  A Raw score of greater than 16 suggests the patient may benefit from discharge to home  Please also refer to the recommendation of the Physical Therapist for safe discharge planning  At present no skilled PT needs apparent  Will d/c PT   Please advise should needs change  PT Discharge Recommendation: No rehabilitation needs (from PT perspective  )          See flowsheet documentation for full assessment  supervision and with back unsupported  Jorge demonstrated sufficient trunk control to sit independently at the edge of the bed.    Standing Balance  (ZULLY = base of support)  Firm Surface: Double Leg      - Static, Eyes Open       - Trial 1 details: contact guard and without UE support  No loss of balance observed with stationary standing.    Balance Tests   Dynamic Gait Index (DGI):   Dynamic Gait Index Score: 16 /24  See flowsheet for complete test.     Greatest impairments observed with vestibulo-ocular challenges to balance (head turns), and when attending to objects in his inferior visual field (stairs, stepping over and around objects).    Coordination  LUE:      - Rapid Alternating Movement Test:        pronation/supination: intact   RUE:      - Rapid Alternating Movement Test:        pronation/supination: intact   LLE:     - Heel-Shin: intact  RLE:     - Heel-Shin: intact  Difficulty command-following with heel-shin testing.    Perception   Diminished perception of CAIN inferior visual fields upon testing.  During the session it was observed that he required cuing to attend to objects below his line of sight.      Bed Mobility  - Supine to sit: independent  - Sit to supine: independent  No assistance required for bed mobility.  Transfers  Assistive devices: gait belt, 1 person  - Toilet: stand by assist  Jorge did not require assistance with transfers during the session.    Ambulation / Gait  - Assistive device: gait belt and 1 person  - Distance (feet unless otherwise indicated): 30, 200  Ambulation completed in the hallway.  - Assist Level: minimal assist  - Surface: even  - Description: ataxic, bumps objects on right, decreased arm swing left and decreased arm swing right  Min assist required to prevent loss of balance while ambulating, especially while completing components of the DGI.  Occasionally required corrections to prevent walking into objects in his inferior visual field.    Stair Ambulation  -  Number of steps: 3;   - Assist Level: minimal assist  - Rails: no rails  - Pattern: reciprocal  Stepping was initiated while too far away from the step, requiring min assistance to maintain balance and safety.   Interventions     Training provided: activity tolerance, balance retraining, body mechanics, functional ambulation, neuromuscular reeducation, safety training, stair training and transfer training    Skilled input: Verbal instruction/cues         Education:   - Present and ready to learn: patient  Education provided during session:  - Results of above outlined education: Verbalizes understanding and Needs reinforcement    ASSESSMENT   Progress: progressing toward goals  Interferring components: cognitive deficits and decreased insight into deficit    Discharge needs based on today's assessment:   - Current level of function: significantly below baseline level of function   - Therapy needs at discharge: therapy 5 or more times per week   - Activities of daily living (ADLs) requiring support at discharge: transfers, ambulation and stairs   - Instrumental activities of daily living (IADLs) requiring support at discharge: care of others, community mobility, financial management, health/medication management, home management, meal preparation and emergency responses   - Impairments that require further therapy intervention: balance, cognition, coordination, executive functioning, safety awareness and visual perception      AM-PAC  - Generalized Prior Level of Function: IND/MOD I ([facility] 22-24)       Key: MOD A=moderate assistance, IND/MOD I=independent/modified independent  - Generalized Current Level of Function:  Current Mobility Score: 20       AM-PAC Scoring Key= >21 Modified Independent; 20-21 Supervision; 18-19 Minimal assist; 13-18 Moderate assist; 9-12 Max assist; <9 Total assist    Jorge mobilized at a level that was significantly below his prior level of function.  He physically tolerated activity well,  but he required additional support due to his lack of insight into his current deficits and his balance while ambulating.  A lack of visual perception into his inferior visual field was observed and cuing is recommended for safe mobility.  Jorge would continue to benefit from skilled inpatient physical therapy in order to continuously monitor his neurological status and to maximize his independence.      PLAN (while hospitalized)  Suggestions for next session as indicated: Training to utilize scanning techniques while ambulating to attend to his inferior visual field, standing exercises with cognitive dual tasks, reassess neurological status; DGI    PT Frequency: 6-7 x per week    Visit # since seen by PT:  0    PT/OT Mobility Equipment for Discharge: WW  PT/OT ADL Equipment for Discharge: shower chair, grab bars    Agreement to plan and goals: patient agrees with goals and treatment plan        GOALS  Review Date: 2/8/2024  Long Term Goals: (to be met by time of discharge from hospital)  Sit to supine: Patient will complete sit to supine independent.  Supine to sit: Patient will complete supine to sit independent.  Sit to stand: Patient will complete sit to stand transfer with least restrictive device, stand by assist.   Stand to sit: Patient will complete stand to sit transfer with least restrictive device, stand by assist.   Stand pivot: Patient will complete stand pivot transfer with least restrictive device, stand by assist.   Ambulation (even): Patient will ambulate on even surface for 200 feet with least restrictive device, supervision.         Patient at End of Session:   Location: in bed  Safety measures: alarm system in place/re-engaged, call light within reach and equipment intact  Handoff to: nurse      I was in the immediate presence of the student and directed the student’s performance of the services. I am responsible for all treatment, assessment, documentation, and billing rendered for this  patient. Care approved by and performed under the direction of on-site therapist. Student’s note read and approved.  Becky Neff PT        Therapy procedure time and total treatment time can be found documented on the Time Entry flowsheet

## (undated) DEVICE — STERILE BETHLEHEM PLASTIC HAND: Brand: CARDINAL HEALTH

## (undated) DEVICE — GLOVE INDICATOR PI UNDERGLOVE SZ 7.5 BLUE

## (undated) DEVICE — ADHESIVE SKIN CLSR DERMABOND NX

## (undated) DEVICE — NEEDLE 18 G X 1 1/2

## (undated) DEVICE — 3M™ STERI-STRIP™ REINFORCED ADHESIVE SKIN CLOSURES, R1547, 1/2 IN X 4 IN (12 MM X 100 MM), 6 STRIPS/ENVELOPE: Brand: 3M™ STERI-STRIP™

## (undated) DEVICE — GLOVE INDICATOR PI UNDERGLOVE SZ 8 BLUE

## (undated) DEVICE — SUT VICRYL 3-0 SH 27 IN J416H

## (undated) DEVICE — NEEDLE HYPO 22G X 1-1/2 IN

## (undated) DEVICE — GLOVE SRG BIOGEL 7.5

## (undated) DEVICE — INTENDED FOR TISSUE SEPARATION, AND OTHER PROCEDURES THAT REQUIRE A SHARP SURGICAL BLADE TO PUNCTURE OR CUT.: Brand: BARD-PARKER ® CARBON RIB-BACK BLADES